# Patient Record
Sex: FEMALE | Race: BLACK OR AFRICAN AMERICAN | NOT HISPANIC OR LATINO | Employment: FULL TIME
[De-identification: names, ages, dates, MRNs, and addresses within clinical notes are randomized per-mention and may not be internally consistent; named-entity substitution may affect disease eponyms.]

---

## 2017-09-10 ENCOUNTER — HEALTH MAINTENANCE LETTER (OUTPATIENT)
Age: 42
End: 2017-09-10

## 2018-09-17 ENCOUNTER — HEALTH MAINTENANCE LETTER (OUTPATIENT)
Age: 43
End: 2018-09-17

## 2019-07-30 ENCOUNTER — APPOINTMENT (OUTPATIENT)
Dept: GENERAL RADIOLOGY | Facility: CLINIC | Age: 44
End: 2019-07-30
Attending: EMERGENCY MEDICINE
Payer: MEDICAID

## 2019-07-30 ENCOUNTER — APPOINTMENT (OUTPATIENT)
Dept: CARDIOLOGY | Facility: CLINIC | Age: 44
End: 2019-07-30
Attending: INTERNAL MEDICINE
Payer: MEDICAID

## 2019-07-30 ENCOUNTER — HOSPITAL ENCOUNTER (OUTPATIENT)
Facility: CLINIC | Age: 44
Setting detail: OBSERVATION
Discharge: HOME | End: 2019-07-31
Attending: EMERGENCY MEDICINE | Admitting: HOSPITALIST
Payer: MEDICAID

## 2019-07-30 DIAGNOSIS — D50.9 MICROCYTIC ANEMIA: ICD-10-CM

## 2019-07-30 DIAGNOSIS — R07.9 ACUTE CHEST PAIN: ICD-10-CM

## 2019-07-30 LAB
ANION GAP SERPL CALCULATED.3IONS-SCNC: 5 MMOL/L (ref 3–14)
B-HCG FREE SERPL-ACNC: <5 IU/L
BASOPHILS # BLD AUTO: 0 10E9/L (ref 0–0.2)
BASOPHILS NFR BLD AUTO: 0.5 %
BUN SERPL-MCNC: 11 MG/DL (ref 7–30)
CALCIUM SERPL-MCNC: 9.3 MG/DL (ref 8.5–10.1)
CHLORIDE SERPL-SCNC: 109 MMOL/L (ref 94–109)
CO2 SERPL-SCNC: 24 MMOL/L (ref 20–32)
CREAT SERPL-MCNC: 0.7 MG/DL (ref 0.52–1.04)
DIFFERENTIAL METHOD BLD: ABNORMAL
EOSINOPHIL # BLD AUTO: 0 10E9/L (ref 0–0.7)
EOSINOPHIL NFR BLD AUTO: 0.5 %
ERYTHROCYTE [DISTWIDTH] IN BLOOD BY AUTOMATED COUNT: 18.1 % (ref 10–15)
GFR SERPL CREATININE-BSD FRML MDRD: >90 ML/MIN/{1.73_M2}
GLUCOSE SERPL-MCNC: 86 MG/DL (ref 70–99)
HCT VFR BLD AUTO: 29.6 % (ref 35–47)
HGB BLD-MCNC: 8.4 G/DL (ref 11.7–15.7)
IMM GRANULOCYTES # BLD: 0 10E9/L (ref 0–0.4)
IMM GRANULOCYTES NFR BLD: 0.2 %
INTERPRETATION ECG - MUSE: NORMAL
IRON SATN MFR SERPL: 3 % (ref 15–46)
IRON SERPL-MCNC: 16 UG/DL (ref 35–180)
LYMPHOCYTES # BLD AUTO: 1.7 10E9/L (ref 0.8–5.3)
LYMPHOCYTES NFR BLD AUTO: 40.1 %
MCH RBC QN AUTO: 19.9 PG (ref 26.5–33)
MCHC RBC AUTO-ENTMCNC: 28.4 G/DL (ref 31.5–36.5)
MCV RBC AUTO: 70 FL (ref 78–100)
MONOCYTES # BLD AUTO: 0.4 10E9/L (ref 0–1.3)
MONOCYTES NFR BLD AUTO: 9 %
NEUTROPHILS # BLD AUTO: 2 10E9/L (ref 1.6–8.3)
NEUTROPHILS NFR BLD AUTO: 49.7 %
NRBC # BLD AUTO: 0 10*3/UL
NRBC BLD AUTO-RTO: 0 /100
PLATELET # BLD AUTO: 352 10E9/L (ref 150–450)
POTASSIUM SERPL-SCNC: 3.9 MMOL/L (ref 3.4–5.3)
RBC # BLD AUTO: 4.22 10E12/L (ref 3.8–5.2)
SODIUM SERPL-SCNC: 139 MMOL/L (ref 133–144)
TIBC SERPL-MCNC: 537 UG/DL (ref 240–430)
TROPONIN I SERPL-MCNC: <0.015 UG/L (ref 0–0.04)
WBC # BLD AUTO: 4.1 10E9/L (ref 4–11)

## 2019-07-30 PROCEDURE — 71046 X-RAY EXAM CHEST 2 VIEWS: CPT

## 2019-07-30 PROCEDURE — 83540 ASSAY OF IRON: CPT | Performed by: EMERGENCY MEDICINE

## 2019-07-30 PROCEDURE — 36415 COLL VENOUS BLD VENIPUNCTURE: CPT | Performed by: PHYSICIAN ASSISTANT

## 2019-07-30 PROCEDURE — 25000132 ZZH RX MED GY IP 250 OP 250 PS 637: Performed by: EMERGENCY MEDICINE

## 2019-07-30 PROCEDURE — 84702 CHORIONIC GONADOTROPIN TEST: CPT

## 2019-07-30 PROCEDURE — 40000264 ECHOCARDIOGRAM LIMITED

## 2019-07-30 PROCEDURE — 93321 DOPPLER ECHO F-UP/LMTD STD: CPT | Mod: 26 | Performed by: INTERNAL MEDICINE

## 2019-07-30 PROCEDURE — 93005 ELECTROCARDIOGRAM TRACING: CPT

## 2019-07-30 PROCEDURE — 93325 DOPPLER ECHO COLOR FLOW MAPG: CPT | Mod: 26 | Performed by: INTERNAL MEDICINE

## 2019-07-30 PROCEDURE — 83550 IRON BINDING TEST: CPT | Performed by: EMERGENCY MEDICINE

## 2019-07-30 PROCEDURE — 25500064 ZZH RX 255 OP 636: Performed by: EMERGENCY MEDICINE

## 2019-07-30 PROCEDURE — 99285 EMERGENCY DEPT VISIT HI MDM: CPT | Mod: 25

## 2019-07-30 PROCEDURE — 80048 BASIC METABOLIC PNL TOTAL CA: CPT | Performed by: EMERGENCY MEDICINE

## 2019-07-30 PROCEDURE — 96374 THER/PROPH/DIAG INJ IV PUSH: CPT

## 2019-07-30 PROCEDURE — 84484 ASSAY OF TROPONIN QUANT: CPT | Performed by: EMERGENCY MEDICINE

## 2019-07-30 PROCEDURE — 85025 COMPLETE CBC W/AUTO DIFF WBC: CPT | Performed by: EMERGENCY MEDICINE

## 2019-07-30 PROCEDURE — 93005 ELECTROCARDIOGRAM TRACING: CPT | Mod: 76

## 2019-07-30 PROCEDURE — 93308 TTE F-UP OR LMTD: CPT | Mod: 26 | Performed by: INTERNAL MEDICINE

## 2019-07-30 PROCEDURE — 84484 ASSAY OF TROPONIN QUANT: CPT | Performed by: PHYSICIAN ASSISTANT

## 2019-07-30 PROCEDURE — G0378 HOSPITAL OBSERVATION PER HR: HCPCS

## 2019-07-30 PROCEDURE — 99220 ZZC INITIAL OBSERVATION CARE,LEVL III: CPT | Performed by: PHYSICIAN ASSISTANT

## 2019-07-30 RX ORDER — NALOXONE HYDROCHLORIDE 0.4 MG/ML
.1-.4 INJECTION, SOLUTION INTRAMUSCULAR; INTRAVENOUS; SUBCUTANEOUS
Status: DISCONTINUED | OUTPATIENT
Start: 2019-07-30 | End: 2019-07-31 | Stop reason: HOSPADM

## 2019-07-30 RX ORDER — ASPIRIN 81 MG/1
162 TABLET, CHEWABLE ORAL ONCE
Status: DISCONTINUED | OUTPATIENT
Start: 2019-07-30 | End: 2019-07-30

## 2019-07-30 RX ORDER — ALUMINA, MAGNESIA, AND SIMETHICONE 2400; 2400; 240 MG/30ML; MG/30ML; MG/30ML
30 SUSPENSION ORAL EVERY 4 HOURS PRN
Status: DISCONTINUED | OUTPATIENT
Start: 2019-07-30 | End: 2019-07-31 | Stop reason: HOSPADM

## 2019-07-30 RX ORDER — LIDOCAINE 40 MG/G
CREAM TOPICAL
Status: DISCONTINUED | OUTPATIENT
Start: 2019-07-30 | End: 2019-07-31 | Stop reason: HOSPADM

## 2019-07-30 RX ORDER — ACETAMINOPHEN 325 MG/1
650 TABLET ORAL EVERY 4 HOURS PRN
Status: DISCONTINUED | OUTPATIENT
Start: 2019-07-30 | End: 2019-07-31 | Stop reason: HOSPADM

## 2019-07-30 RX ORDER — ACETAMINOPHEN 650 MG/1
650 SUPPOSITORY RECTAL EVERY 4 HOURS PRN
Status: DISCONTINUED | OUTPATIENT
Start: 2019-07-30 | End: 2019-07-31 | Stop reason: HOSPADM

## 2019-07-30 RX ORDER — NITROGLYCERIN 0.4 MG/1
0.4 TABLET SUBLINGUAL ONCE
Status: COMPLETED | OUTPATIENT
Start: 2019-07-30 | End: 2019-07-30

## 2019-07-30 RX ORDER — ASPIRIN 81 MG/1
324 TABLET, CHEWABLE ORAL ONCE
Status: COMPLETED | OUTPATIENT
Start: 2019-07-30 | End: 2019-07-30

## 2019-07-30 RX ORDER — ASPIRIN 81 MG/1
81 TABLET ORAL DAILY
Status: DISCONTINUED | OUTPATIENT
Start: 2019-07-31 | End: 2019-07-31 | Stop reason: HOSPADM

## 2019-07-30 RX ORDER — NITROGLYCERIN 0.4 MG/1
0.4 TABLET SUBLINGUAL EVERY 5 MIN PRN
Status: DISCONTINUED | OUTPATIENT
Start: 2019-07-30 | End: 2019-07-31 | Stop reason: HOSPADM

## 2019-07-30 RX ADMIN — HUMAN ALBUMIN MICROSPHERES AND PERFLUTREN 2 ML: 10; .22 INJECTION, SOLUTION INTRAVENOUS at 16:25

## 2019-07-30 RX ADMIN — ASPIRIN 81 MG 324 MG: 81 TABLET ORAL at 15:29

## 2019-07-30 RX ADMIN — NITROGLYCERIN 0.4 MG: 0.4 TABLET SUBLINGUAL at 12:05

## 2019-07-30 RX ADMIN — NITROGLYCERIN 0.4 MG: 0.4 TABLET SUBLINGUAL at 11:48

## 2019-07-30 ASSESSMENT — ENCOUNTER SYMPTOMS
COUGH: 0
SHORTNESS OF BREATH: 0
CHEST TIGHTNESS: 1

## 2019-07-30 ASSESSMENT — MIFFLIN-ST. JEOR: SCORE: 1727.5

## 2019-07-30 NOTE — PLAN OF CARE
"PRIMARY DIAGNOSIS: CHEST PAIN  OUTPATIENT/OBSERVATION GOALS TO BE MET BEFORE DISCHARGE:  1. Ruled out acute coronary syndrome (negative or stable Troponin): Yes  2. Pain Status: 2/10 mid-left chest  3. Appropriate provocative testing performed: echo complete, see results plan for stress test watson.  - Stress Test Procedure: stress echo tomorrow  - Interpretation of cardiac rhythm per telemetry tech: SR, HR 75    4. Cleared by Consultants (if applicable):NA  5. Return to near baseline physical activity: YES  Discharge Planner Nurse   Safe discharge environment identified: Yes  Barriers to discharge: stress test results         Entered by: April Schreiber 07/30/2019 6:59 PM   /69 (BP Location: Right arm)   Pulse 72   Temp 98.7  F (37.1  C) (Oral)   Resp 16   Ht 1.727 m (5' 8\")   Wt 101.4 kg (223 lb 8.7 oz)   SpO2 100%   BMI 33.99 kg/m    Vitals stable. Pt alert and oriented x4. Independently moving. Denies SOB/lightheadedness/dizziness. Pt continues to report mid-left sided chest pain 2/10, declines need for pain intervention. Trop neg x2. Echo stress test scheduled for tomorrow. Will continue to monitor and provide supportive cares.   Please review provider order for any additional goals.   Nurse to notify provider when observation goals have been met and patient is ready for discharge.    "

## 2019-07-30 NOTE — ED TRIAGE NOTES
5 days intermittent left sided chest pain. Pain is piercing type pain 2-3 times/day. Mild SOB, ABC's intact. A/ox 3.

## 2019-07-30 NOTE — ED NOTES
.  [facility]  ED Nurse Handoff Report    Toyin Andrew is a 43 year old female   ED Chief complaint: Chest Pain  . ED Diagnosis:   Final diagnoses:   Acute chest pain   Microcytic anemia     Allergies: No Known Allergies    Code Status: Full Code  Activity level - Baseline/Home:  Independent. Activity Level - Current:   Independent. Lift room needed: No. Bariatric: No   Needed: No   Isolation: No. Infection: Not Applicable.     Vital Signs:   Vitals:    07/30/19 1445 07/30/19 1500 07/30/19 1515 07/30/19 1530   BP: 110/71 104/68 109/69 110/86   Pulse: 67 63 63 79   Resp: 19 21 20 16   Temp:       TempSrc:       SpO2: 100% 99% 100% 100%   Weight:           Cardiac Rhythm:  ,   Cardiac  Cardiac Rhythm: Normal sinus rhythm  Pain level: 0-10 Pain Scale: 0  Patient confused: No. Patient Falls Risk: No.   Elimination Status: Has voided   Patient Report - Initial Complaint: 43 year old female who presents with chest pain. The patient states that she has been having intermittent, left sided chest pain for the last 5 days. She reports that the pain is piercing for approximately 2 minutes then is a duller, chest pressure and tightness for about an hour following the intense pain. She states these episodes occur 2-3 times day. She states that these occur when she's at rest as well as during activity. She states that exertion does not change her pain. She reports that is is not very active and has been getting very tired from activity. Positionally, the patient states sitting up relieves some of her pain. She rates her current pain at a 4.5/10. She denies any cough, shortness of breath, leg swelling or any other symptoms.   Focused Assessment:   Cardiac - Cardiac WDL: -WDL except; chest pain   Review of Systems (Cardiac) - Cardiovascular Symptoms/Conditions: chest pain (left upper chest discomfort, reports as stabbing and radiastes thorugh to left upper back)  Chest Pain Assessment - Precipitating Factors:  nothing  Chest Pain Reproducible?: No   Cardiac Monitoring - EKG Monitoring: Yes  Cardiac Regularity: Regular  Cardiac Rhythm: NSR  WB      12:22 Respiratory Respiratory - Respiratory WDL: WDL         Tests Performed: EKG, CXR, labs. Abnormal Results: .  Labs Ordered and Resulted from Time of ED Arrival Up to the Time of Departure from the ED   CBC WITH PLATELETS DIFFERENTIAL - Abnormal; Notable for the following components:       Result Value    Hemoglobin 8.4 (*)     Hematocrit 29.6 (*)     MCV 70 (*)     MCH 19.9 (*)     MCHC 28.4 (*)     RDW 18.1 (*)     All other components within normal limits   BASIC METABOLIC PANEL   TROPONIN I   TROPONIN I   ISTAT HCG QUANTITATIVE PREGNANCY NURSING POCT   ISTAT HCG QUANTITATIVE PREGNANCY POCT     .  XR Chest 2 Views   Final Result   IMPRESSION: Negative chest. Lungs clear.      NURA EVANS MD        .   Treatments provided: see ED meds below  Family Comments: NA  OBS brochure/video discussed/provided to patient:  yes  ED Medications:   Medications   nitroGLYcerin (NITROSTAT) sublingual tablet 0.4 mg (0.4 mg Sublingual Given 7/30/19 1148)   nitroGLYcerin (NITROSTAT) sublingual tablet 0.4 mg (0.4 mg Sublingual Given 7/30/19 1205)   aspirin (ASA) chewable tablet 324 mg (324 mg Oral Given 7/30/19 1529)     Drips infusing:  No  For the majority of the shift, the patient's behavior Green. Interventions performed were NA.     Severe Sepsis OR Septic Shock Diagnosis Present: No      ED Nurse Name/Phone Number: IVAN Miles  3:41 PM  RECEIVING UNIT ED HANDOFF REVIEW    Above ED Nurse Handoff Report was reviewed:YES  Reviewed by: April Schreiber on July 30, 2019 at 4:44 PM

## 2019-07-30 NOTE — PROGRESS NOTES
ROOM # 205    Living Situation (if not independent, order SW consult): zina with family  Facility name: NA  : husband-Jean-claude    Activity level at baseline: zina  Activity level on admit: zina      Patient registered to observation; given Patient Bill of Rights; given the opportunity to ask questions about observation status and their plan of care.  Patient has been oriented to the observation room, bathroom and call light is in place.    Discussed discharge goals and expectations with patient/family.

## 2019-07-30 NOTE — ED PROVIDER NOTES
History     Chief Complaint:  Chest pain     The history is provided by the patient.      Toyin Andrew is a 43 year old female who presents with chest pain. The patient states that she has been having intermittent, left sided chest pain for the last 5 days. She reports that the pain is piercing for approximately 2 minutes then is a duller, chest pressure and tightness for about an hour following the intense pain. She states these episodes occur 2-3 times day. She states that these occur when she's at rest as well as during activity. She states that exertion does not change her pain. She reports that is not very active and has been getting very tired from activity. Positionally, the patient states sitting up relieves some of her pain. She rates her current pain at a 4.5/10. She denies any cough, shortness of breath, leg swelling or any other symptoms.     Cardiac/PE/DVT Risk Factors:  History of hypertension - no  History of hyperlipidemia - no  History of diabetes - no  History of smoking - no  Personal history of PE/DVT - no  Family history of PE/DVT - no  Family history of heart complications - no  Recent travel - no  Recent surgery - no  Other immobilizations - no  Cancer - no  Birth control- no    Allergies:  No Known Allergies     Medications:    The patient is not currently taking any prescribed medications.     Past Medical History:    Anemia  HSIL  Uterine fibroid     Past Surgical History:    Left breast biopsy   C section   Uterine fibroids surgery   Right foot lump removal     Family History:    Diabetes- father, mother   Hypertension- father    Social History:  The patient was accompanied to the ED by .  Smoking Status: Never Smoker  Smokeless Tobacco: Never Used  Alcohol Use: Positive  Marital Status:       Review of Systems   Respiratory: Positive for chest tightness. Negative for cough and shortness of breath.    Cardiovascular: Positive for chest pain. Negative for leg swelling.   All  other systems reviewed and are negative.      Physical Exam     Patient Vitals for the past 24 hrs:   BP Temp Temp src Pulse Heart Rate Resp SpO2 Weight   07/30/19 1530 110/86 -- -- 79 87 16 100 % --   07/30/19 1515 109/69 -- -- 63 64 20 100 % --   07/30/19 1500 104/68 -- -- 63 62 21 99 % --   07/30/19 1445 110/71 -- -- 67 63 19 100 % --   07/30/19 1430 108/64 -- -- 73 65 21 99 % --   07/30/19 1415 101/69 -- -- 71 72 16 100 % --   07/30/19 1400 101/56 -- -- 60 60 18 99 % --   07/30/19 1345 100/59 -- -- 59 59 18 99 % --   07/30/19 1330 100/63 -- -- 59 60 17 99 % --   07/30/19 1315 111/69 -- -- 58 60 18 100 % --   07/30/19 1300 104/65 -- -- 58 60 18 100 % --   07/30/19 1245 102/62 -- -- 59 58 16 100 % --   07/30/19 1215 98/60 -- -- 71 71 18 94 % --   07/30/19 1200 -- -- -- -- -- -- 96 % --   07/30/19 1142 -- -- -- -- -- -- 98 % --   07/30/19 1130 127/78 97.8  F (36.6  C) Oral -- 80 16 -- 95.3 kg (210 lb)       Physical Exam    General:   Well-nourished   Speaking in full sentences  Eyes:   Conjunctiva without injection or scleral icterus  ENT:   Moist mucous membranes   Nares patent   Pinnae normal  Neck:   Full ROM   No stiffness appreciated  Resp:   Lungs CTAB   No crackles, wheezing or audible rubs   Good air movement  CV:    Normal rate, regular rhythm   S1 and S2 present   No murmur, gallop or rub  GI:   BS present   Abdomen soft without distention   Non-tender to light and deep palpation   No guarding or rebound tenderness  Skin:   Warm, dry, well perfused   No rashes or open wounds on exposed skin  MSK:   Moves all extremities   No focal deformities or swelling   Tenderness to palpation over left chest wall, though this does not entirely reproduce pain  Neuro:   Alert   Answers questions appropriately   Moves all extremities equally   Gait stable  Psych:   Normal affect, normal mood    PERC Rule for risk stratifying PE to low risk (calculator)  Background  Risk stratifies patients to low risk of PE if all 8  criteria are present including age <50, heart rate <100, O2 Sat >94%, no unilateral leg edema, no hemoptysis, no recent surgery or trauma, no prior VTE, and no hormone use.  Data  43 year old  has Uterine leiomyoma; CARDIOVASCULAR SCREENING; LDL GOAL LESS THAN 160; High-risk pregnancy; Blood type A+; and Fibroid, uterine on their problem list.   has a past surgical history that includes surgical history of -  (); surgical history of - ; biopsy of breast, incisional (); and  section (2013).     Criteria   Of  8 possible items (all criteria must be present):  Age <50 years  Heart rate <100 bpm  Oxygen Saturation >94%  No unilateral leg swelling  No hemoptysis  No surgery or trauma within 4 weeks  No prior DVT or PE  No hormone use (oral, transdermal and intravaginal estrogens)  Interpretation  All eight criteria are met AND low clinical PE suspicion: No further evaluation for PE required    Emergency Department Course     ECG:  ECG taken at 1137, ECG read at 1137  Normal sinus rhythm  Moderate voltage criteria for LVH, may be normal variant  boderline ECG  Rate 66 bpm. RI interval 146 ms. QRS duration 84 ms. QT/QTc 416/436 ms. P-R-T axes 62 -12 -8.     ECG:  ECG taken at 1213, ECG read at 1213  Normal sinus rhythm  Moderate voltage criteria for LVH, may be normal variant  Nonspecific T wave abnormality    abnormal ECG  Rate 74 bpm. RI interval 150 ms. QRS duration 80 ms. QT/QTc 404/448 ms. P-R-T axes 46 -13 -12.    ECG:  ECG taken at 1423, ECG read at 1423  Normal sinus rhythm with sinus arrhythmia   Moderate voltage criteria for LVH, may be normal variant  borderline ECG  Rate 66 bpm. RI interval 154 ms. QRS duration 82 ms. QT/QTc 428/448 ms. P-R-T axes 48 -11 -5. No significant change when compared to EKG dated 19.      Imaging:  Radiology findings were communicated with the patient who voiced understanding of the findings.    XR chest 2 views:  Negative chest. Lungs clear.  Reading per  radiology    Laboratory:  Laboratory findings were communicated with the patient who voiced understanding of the findings.    Troponin 1435: <0.015  Troponin 1201: <0.015    CBC: WBC 4.1, HGB 8.4(L),   BMP: WNL (Creatinine 0.70)    ISTAT HCG: negative     Interventions:  1148 nitrostat 0.4 mg sublingual   1205 nitrostat 0.4 mg sublingual  1529 aspirin 324 mg oral      Emergency Department Course:     Nursing notes and vitals reviewed.    1145 I performed an exam of the patient as documented above.     1201 IV was inserted and blood was drawn for laboratory testing, results above.    1221 The patient was sent for a chest XR while in the emergency department, results above.     1230 I returned to check on patient.  The patient reports no chest pain.     1414 I returned to check on patient.  The patient reports no chest pain.     1435 IV was inserted and blood was drawn for laboratory testing, results above.    1522 I spoke with Dr. Mack of Cardiology regarding patient's presentation, findings, and plan of care.     1541  I spoke with Rosie Duque PA-C of the Hospitalist service from [facility] regarding patient's presentation, findings, and plan of care.    1550 I personally reviewed the laboratory and imaging results with the patient and answered all related questions prior to admit.       Impression & Plan      Medical Decision Making:  Toyin Andrew is a 43-year-old female presenting to the emergency department accompanied by her  for evaluation of chest pain.  VS on presentation unremarkable.  By history, patient reports intermittent episodes of left-sided chest pressure without radiation.  Ddx is broad, including though not limited to, ACS, coronary artery dissection, vasospasm PE, dissection, pneumothorax, pneumonia, atypical reflux, muscular skeletal pain, among others.  EKG obtained at the time of arrival demonstrates sinus rhythm with 0.5 mm SIM in lead I and aVL, 0.5 mm STD in lead  III, and TWI in lead III and aVF.  Pt is not quite meeting criteria for cath lab activation, and no prior EKGs are available for comparison.  Pt was provided ASA and 2 SL nitroglycerin tabs, with resolution of her pain.  Two successive EKGs are without evolving changes.  Troponin negative.  Case and EKG reviewed with Dr. Mack of cardiology who at this time suggested transthoracic echocardiogram, as well as observation for provacative study tomorrow morning.  CXR negative for pneumothorax or pneumonia.  History and exam not consistent with aortic dissection.  PE unlikely as patient is low risk by Wells criteria and is PERC negative.  Pt updated regarding EKG findings and recommended plan of care and is agreeable.  Questions answered prior to admission.    Diagnosis:    ICD-10-CM    1. Acute chest pain R07.9 Basic metabolic panel     Troponin I     Troponin I   2. Microcytic anemia D50.9      Disposition:   The patient is admitted into the care of Rosie Duque, accepting for Dr. Rebolledo.    Scribe Disclosure:  Eugenia KELLEY, am serving as a scribe at 11:37 AM on 7/30/2019 to document services personally performed by Gabriel Flores MD based on my observations and the provider's statements to me.  [facility] EMERGENCY DEPARTMENT       Gabriel Flores MD  07/30/19 9087

## 2019-07-30 NOTE — PHARMACY-ADMISSION MEDICATION HISTORY
Admission medication history interview status for this patient is complete. See [facility] admission navigator for allergy information, prior to admission medications and immunization status.     Medication history interview source(s):Patient  Medication history resources (including written lists, pill bottles, clinic record):None    Changes made to PTA medication list:  Added: none  Deleted: triamcinolone ointment  Changed: none.     Actions taken by pharmacist (provider contacted, etc):None     Additional medication history information: Patient does not take any prescription or OTC medications.    Medication reconciliation/reorder completed by provider prior to medication history? No    Prior to Admission medications    Not on File

## 2019-07-30 NOTE — H&P
History and Physical     Toyin Andrew MRN# [id number]   YOB: 1975 Age: 43 year old      Date of Admission:  7/30/2019    Primary care provider: No Ref-Primary, Physician          Assessment and Plan:   Toyin Andrew is a 43 year old female with a PMH significant for iron deficiency anemia who presents with atypical chest discomfort.     Patient was discussed with Dr. Flores, who was provider in ED. Chart review of ED work up was reviewed as well as chart review of Care Everywhere, previous visits and admissions.     #Atypical nonexertional left-sided chest discomfort  Patient presents with nonexertional left-sided stabbing/pressure sometimes associated with shortness of breath but no diaphoresis, lightheadedness or nausea.  There is radiation to her back but no radiation to her arm or jaw.  She cannot reproduce the symptoms nor can she make them worse with movement.  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  She is obese but has no history of diabetes, hypertension, hyperlipidemia or family history of heart disease.  EKG is nonischemic but there is slight ST elevation in aVL with depression in V3 and cardiology was called from the ED requesting an echocardiogram and stress test.  -Monitor on telemetry  -Trend troponins  -Order lipid panel and A1c  -We will order echocardiogram and stress echocardiogram as recommended by cardiology  -Continue baby aspirin    #Microcytic anemia  Patient has no complaints of shortness of breath, lightheadedness, dizziness or increased fatigue.  Her hemoglobin is 8.4.  We did not discuss if she is still having heavy menses or takes iron supplements.  Her chart review states history of iron deficiency anemia.  -Add on iron studies  -Likely follow-up in primary care      Social: No concerns  Code: Discussed with patient and they have chosen full code  VTE prophylaxis: Ambulation  Disposition: Observation                    Chief Complaint:   Chest discomfort          History of Present Illness:   Toyin Andrew is a 43 year old female who presents with atypical nonexertional chest discomfort.  She has noticed 3 weeks of intermittent left-sided stabbing chest pressure that lasts for seconds with radiation to her back but no radiation down her arm or into her jaw associated with some shortness of breath but no nausea, lightheadedness or diaphoresis.  The pain comes on sporadically and is not related to exertion.  She has never had similar pains before and denies any recent injury but does work as a CNA.  When she received aspirin and nitro in the ED she states that this delayed the symptoms from returning.  She has not had any recent long car travel, air travel or surgeries.  She has not been ill recently and denies infections.  She does not take any medications regularly, smokes cigarettes or drink alcohol.  She denies all risk factors for heart disease.               Past Medical History:     Past Medical History:   Diagnosis Date     Anemia     currently taking iron     History of colposcopy with cervical biopsy 11    VONDA I     HSIL (high grade squamous intraepithelial lesion) on Pap smear 11     Pap smear of cervix with ASCUS, cannot exclude HGSIL 13     Uterine fibroid                Past Surgical History:     Past Surgical History:   Procedure Laterality Date     BIOPSY OF BREAST, INCISIONAL      left breast biopsy - negative      SECTION  2013    Procedure:  SECTION;  primary  section ;  Surgeon: Linda Borja MD;  Location: [facility] L+D     SURGICAL HISTORY OF -       uterine fibroids     SURGICAL HISTORY OF -       right foot lump removal               Social History:     Social History     Socioeconomic History     Marital status:      Spouse name: Not on file     Number of children: Not on file     Years of education: Not on file     Highest education level: Not on file   Occupational History     Not on file    Social Needs     Financial resource strain: Not on file     Food insecurity:     Worry: Not on file     Inability: Not on file     Transportation needs:     Medical: Not on file     Non-medical: Not on file   Tobacco Use     Smoking status: Never Smoker     Smokeless tobacco: Never Used   Substance and Sexual Activity     Alcohol use: No     Comment: Not during pregnancy.     Drug use: No     Sexual activity: Yes     Partners: Male   Lifestyle     Physical activity:     Days per week: Not on file     Minutes per session: Not on file     Stress: Not on file   Relationships     Social connections:     Talks on phone: Not on file     Gets together: Not on file     Attends Voodoo service: Not on file     Active member of club or organization: Not on file     Attends meetings of clubs or organizations: Not on file     Relationship status: Not on file     Intimate partner violence:     Fear of current or ex partner: Not on file     Emotionally abused: Not on file     Physically abused: Not on file     Forced sexual activity: Not on file   Other Topics Concern     Parent/sibling w/ CABG, MI or angioplasty before 65F 55M? Not Asked   Social History Narrative     Not on file               Family History:     Family History   Problem Relation Age of Onset     Diabetes Father      Hypertension Father      Diabetes Mother               Allergies:    No Known Allergies            Medications:     Prior to Admission medications    Medication Sig Last Dose Taking? Auth Provider   triamcinolone (KENALOG) 0.1 % ointment Apply sparingly to affected area three times daily for 14 days.   Fawad Morris MD              Review of Systems:   A Comprehensive greater than 10 system review of systems was carried out.  Pertinent positives and negatives are noted above.  Otherwise negative for contributory information.            Physical Exam:   Blood pressure 120/75, pulse 72, temperature 97.8  F (36.6  C), temperature source Oral,  "resp. rate 19, height 1.727 m (5' 8\"), weight 101.4 kg (223 lb 8.7 oz), SpO2 100 %.  Exam:  GENERAL:  Comfortable.  PSYCH: pleasant, oriented, No acute distress.  HEENT:  PERRLA. Normal conjunctiva, normal hearing, nasal mucosa and Oropharynx are normal.  NECK:  Supple, no neck vein distention, adenopathy or bruits, normal thyroid.  HEART:  Normal S1, S2 with no murmur, no pericardial rub, gallops or S3 or S4.  LUNGS:  Clear to auscultation, normal Respiratory effort. No wheezing, rales or ronchi.  ABDOMEN:  Soft, no hepatosplenomegaly, normal bowel sounds. Non-tender, non distended.   EXTREMITIES:  No pedal edema, +2 pulses bilateral and equal.  SKIN:  Dry to touch, No rash, wound or ulcerations.  NEUROLOGIC:  CN 2-12 intact, BL 5/5 symmetric upper and lower extremity strength, sensation is intact with no focal deficits.               Data:     Recent Labs   Lab 07/30/19  1201   WBC 4.1   HGB 8.4*   HCT 29.6*   MCV 70*        Recent Labs   Lab 07/30/19  1201      POTASSIUM 3.9   CHLORIDE 109   CO2 24   ANIONGAP 5   GLC 86   BUN 11   CR 0.70   GFRESTIMATED >90   GFRESTBLACK >90   GEORGETTE 9.3     No results for input(s): DD in the last 168 hours.  Recent Labs   Lab 07/30/19  1435 07/30/19  1201   TROPI <0.015 <0.015         Recent Results (from the past 24 hour(s))   XR Chest 2 Views    Narrative    CHEST TWO VIEWS  7/30/2019 12:24 PM     HISTORY:  Chest pain.    COMPARISON: None.      Impression    IMPRESSION: Negative chest. Lungs clear.    MD Rosie CHEN PA-C         "

## 2019-07-31 ENCOUNTER — APPOINTMENT (OUTPATIENT)
Dept: CARDIOLOGY | Facility: CLINIC | Age: 44
End: 2019-07-31
Attending: PHYSICIAN ASSISTANT
Payer: MEDICAID

## 2019-07-31 VITALS
HEIGHT: 68 IN | SYSTOLIC BLOOD PRESSURE: 120 MMHG | BODY MASS INDEX: 33.88 KG/M2 | TEMPERATURE: 98.1 F | WEIGHT: 223.55 LBS | RESPIRATION RATE: 16 BRPM | OXYGEN SATURATION: 98 % | DIASTOLIC BLOOD PRESSURE: 65 MMHG | HEART RATE: 68 BPM

## 2019-07-31 LAB
CHOLEST SERPL-MCNC: 185 MG/DL
HBA1C MFR BLD: 5.1 % (ref 0–5.6)
HDLC SERPL-MCNC: 47 MG/DL
INTERPRETATION ECG - MUSE: NORMAL
LDLC SERPL CALC-MCNC: 116 MG/DL
NONHDLC SERPL-MCNC: 130 MG/DL
TRIGL SERPL-MCNC: 71 MG/DL

## 2019-07-31 PROCEDURE — 93018 CV STRESS TEST I&R ONLY: CPT | Performed by: INTERNAL MEDICINE

## 2019-07-31 PROCEDURE — 93325 DOPPLER ECHO COLOR FLOW MAPG: CPT | Mod: 26 | Performed by: INTERNAL MEDICINE

## 2019-07-31 PROCEDURE — G0378 HOSPITAL OBSERVATION PER HR: HCPCS

## 2019-07-31 PROCEDURE — 93350 STRESS TTE ONLY: CPT | Mod: 26 | Performed by: INTERNAL MEDICINE

## 2019-07-31 PROCEDURE — 93321 DOPPLER ECHO F-UP/LMTD STD: CPT | Mod: 26 | Performed by: INTERNAL MEDICINE

## 2019-07-31 PROCEDURE — 93016 CV STRESS TEST SUPVJ ONLY: CPT | Performed by: INTERNAL MEDICINE

## 2019-07-31 PROCEDURE — 25500064 ZZH RX 255 OP 636: Performed by: HOSPITALIST

## 2019-07-31 PROCEDURE — 36415 COLL VENOUS BLD VENIPUNCTURE: CPT | Performed by: PHYSICIAN ASSISTANT

## 2019-07-31 PROCEDURE — 99217 ZZC OBSERVATION CARE DISCHARGE: CPT | Performed by: PHYSICIAN ASSISTANT

## 2019-07-31 PROCEDURE — 80061 LIPID PANEL: CPT | Performed by: PHYSICIAN ASSISTANT

## 2019-07-31 PROCEDURE — 83036 HEMOGLOBIN GLYCOSYLATED A1C: CPT | Performed by: PHYSICIAN ASSISTANT

## 2019-07-31 PROCEDURE — 25000132 ZZH RX MED GY IP 250 OP 250 PS 637: Performed by: PHYSICIAN ASSISTANT

## 2019-07-31 PROCEDURE — 40000264 ECHO STRESS ECHOCARDIOGRAM

## 2019-07-31 RX ORDER — FERROUS GLUCONATE 324(38)MG
324 TABLET ORAL
Refills: 0 | COMMUNITY
Start: 2019-07-31

## 2019-07-31 RX ADMIN — ASPIRIN 81 MG: 81 TABLET, COATED ORAL at 08:10

## 2019-07-31 RX ADMIN — HUMAN ALBUMIN MICROSPHERES AND PERFLUTREN 3 ML: 10; .22 INJECTION, SOLUTION INTRAVENOUS at 07:44

## 2019-07-31 NOTE — PLAN OF CARE
PRIMARY DIAGNOSIS: CHEST PAIN  OUTPATIENT/OBSERVATION GOALS TO BE MET BEFORE DISCHARGE:  1. Ruled out acute coronary syndrome (negative or stable Troponin):  Yes  2. Pain Status: Pain free.  3. Appropriate provocative testing performed: No  - Stress Test Procedure: Stress test in AM   - Interpretation of cardiac rhythm per telemetry tech: SR in 70's     4. Cleared by Consultants (if applicable):N/A  5. Return to near baseline physical activity: Yes  Discharge Planner Nurse   Safe discharge environment identified: Yes  Barriers to discharge: Yes. Stress test results.        Entered by: Julieta Vivas 07/31/2019 1:40 AM     Please review provider order for any additional goals.   Nurse to notify provider when observation goals have been met and patient is ready for discharge.

## 2019-07-31 NOTE — PLAN OF CARE
Patient's After Visit Summary was reviewed with patient.  Patient verbalized understanding of After Visit Summary, recommended follow up and was given an opportunity to ask questions.   Discharge medications sent home with patient/family: yes.  Discharged by self - pt feels safe to drive home, no narcs given during hospital stay.      OBSERVATION patient END time: 8933

## 2019-07-31 NOTE — DISCHARGE SUMMARY
[facility] Outpatient / Observation Unit  Discharge Summary        Toyin Andrew MRN# [id number]   YOB: 1975 Age: 43 year old     Date of Admission: 7/30/2019  Date of Discharge: 7/31/2019  Admitting Physician: Felipe Rebolledo MD  Discharge Physician: Mary Art PA-C  Discharging Service: Hospitalist      Primary Provider: No Ref-Primary, Physician  Primary Care Physician Phone Number: None         Primary Discharge Diagnoses:    Toyin Andrew is a 44 y/o female with PMH significant for iron deficiency anemia was admitted on 7/30/2019 for concerns of acute chest pain.     #Atypical nonexertional left-sided chest discomfort  Patient presents with nonexertional left-sided stabbing/pressure sometimes associated with shortness of breath but no diaphoresis, lightheadedness or nausea.  There is radiation to her back but no radiation to her arm or jaw.  She cannot reproduce the symptoms nor can she make them worse with movement.  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  She is obese but total cholesterol showed LDL of 116 otherwise WNL, HgbA1c WNL, and blood pressure stable during stay. EKG on admission was nonischemic but there is slight ST elevation in aVL with depression in V3 and cardiology was called from the ED requesting an echocardiogram and stress test which were normal.      #Microcytic anemia  Hemoglobin of 8.4 on admission. H/o heavy menses and uterine fibroids. Iron studies consistent with iron deficiency. Patient previously on iron supplements which she has not been taking. Encouraged patient to restart iron supplements and establish care with PCP for monitoring and recheck of hemoglobin.         Secondary Discharge Diagnoses:     Past Medical History:   Diagnosis Date     Anemia     currently taking iron     History of colposcopy with cervical biopsy 8/2/11    VONDA I     HSIL (high grade squamous intraepithelial lesion) on Pap smear 6/14/11     Pap smear of cervix with ASCUS, cannot  exclude HGSIL 1/22/13     Uterine fibroid             Code Status:      Full Code        Brief Hospital Summary:        Reason for your hospital stay      You were admitted to the observation unit for chest pain. Your heart was   monitored overnight with no abnormal findings. Your cardiac enzymes were   negative for heart attack. You had an ultrasound of your heart   (echocardiogram) which showed normal pumping function and no wall motion   changes and stress echocardiogram that was negative for heart disease so   we do not believe your chest pain was related to your heart. Your chest   pain may be due to recent increased stress. We recommend you follow up   with your primary doctor if you continue to have pain. It was noted that   your hemoglobin was low at 8.4 and you have a known history anemia due to   iron deficiency from heavy menses and fibroids, it is recommended you   restart supplemental iron and follow up with primary care for monitoring   of this.             Please refer to initial admission history and physical for further details.   Briefly, Toyin Andrew was admitted on 7/30/2019 for concerns of acute chest pain. Initial work up in the ED did not reveal evidence of STEMI or findings consistent with unstable angina or acute coronary ischemia. Pt was registered to the Observation Unit for further evaluation.     Pt ruled out with serial troponins, underwent Echocardiogram which showed normal pumping function and no WMA and Stress Echo that did not show evidence of significant coronary ischemia. Labs were reviewed and significant results addressed. On the day of discharge, pt was pain free, with no complaints of pain. Medications were reviewed and adjustments made as necessary. Pt is instructed to follow up as below.           Significant Lab During Hospitalization:      Recent Labs   Lab 07/30/19  2234 07/30/19  1850 07/30/19  1435   TROPI <0.015 <0.015 <0.015              Significant Imaging During  "Hospitalization:      Results for orders placed or performed during the hospital encounter of 07/30/19   XR Chest 2 Views    Narrative    CHEST TWO VIEWS  7/30/2019 12:24 PM     HISTORY:  Chest pain.    COMPARISON: None.      Impression    IMPRESSION: Negative chest. Lungs clear.    NURA EVANS MD              Pending Results:      None        Consultations This Hospital Stay:      No consultations were requested during this admission         Discharge Instructions and Follow-Up:      Follow-up Appointments     Follow-up and recommended labs and tests       Follow up with primary care provider within 7 days for hospital follow-up   and establish care. The following labs/tests are recommended: hemoglobin.                 Discharge Disposition:      Discharged to home         Discharge Medications:        Current Discharge Medication List      START taking these medications    Details   ferrous gluconate (FERGON) 324 (38 Fe) MG tablet Take 1 tablet (324 mg) by mouth daily (with breakfast)  Refills: 0    Associated Diagnoses: Microcytic anemia                 Allergies:       No Known Allergies        Condition and Physical on Discharge:      Discharge condition: Stable   Vitals: Blood pressure 104/64, pulse 73, temperature 99.1  F (37.3  C), temperature source Oral, resp. rate 16, height 1.727 m (5' 8\"), weight 101.4 kg (223 lb 8.7 oz), SpO2 100 %.  223 lbs 8.74 oz      GENERAL:  Comfortable.  PSYCH: pleasant, oriented, No acute distress.  HEENT:  PERRLA. Normal conjunctiva, normal hearing, nasal mucosa and oropharynx are normal.  NECK:  Supple, no neck vein distention, adenopathy or bruits, normal thyroid.  HEART:  Normal S1, S2 with no murmur, no pericardial rub, gallops or S3 or S4.  LUNGS:  Clear to auscultation, normal respiratory effort. No wheezing, rales or ronchi.  ABDOMEN:  Soft, no hepatosplenomegaly, normal bowel sounds. Non-tender, non distended.   EXTREMITIES:  No pedal edema, +2 radial and " posterior tibial pulses bilateral and equal.  SKIN:  Dry to touch, No rash, wound or ulcerations.  NEUROLOGIC:  CN 2-12 intact, BL 5/5 symmetric upper and lower extremity strength, sensation is intact with no focal deficits.     Mary Art PA-C

## 2019-07-31 NOTE — PLAN OF CARE
"PRIMARY DIAGNOSIS: CHEST PAIN  OUTPATIENT/OBSERVATION GOALS TO BE MET BEFORE DISCHARGE:  1. Ruled out acute coronary syndrome (negative or stable Troponin):  Yes  2. Pain Status: Pain free.  3. Appropriate provocative testing performed: No  - Stress Test Procedure: Stress test in AM   - Interpretation of cardiac rhythm per telemetry tech: SR in 70's     4. Cleared by Consultants (if applicable):N/A  5. Return to near baseline physical activity: Yes  Discharge Planner Nurse   Safe discharge environment identified: Yes  Barriers to discharge: Yes. Stress test results.        Entered by: Julieta Vivas 07/31/2019 4:32 AM    Vital signs:  Temp: 97.2  F (36.2  C) Temp src: Oral BP: 106/60 Pulse: 73 Heart Rate: 65 Resp: 16 SpO2: 98 % O2 Device: None (Room air)   Height: 172.7 cm (5' 8\") Weight: 101.4 kg (223 lb 8.7 oz)  Pt A&Ox4. Independent in the room. LS clear. BS audible. Denies any chest pain. Trops neg x3. Echo stress test scheduled for this AM. Will continue to monitor.       Please review provider order for any additional goals.   Nurse to notify provider when observation goals have been met and patient is ready for discharge.    "

## 2019-07-31 NOTE — PLAN OF CARE
"PRIMARY DIAGNOSIS: CHEST PAIN  OUTPATIENT/OBSERVATION GOALS TO BE MET BEFORE DISCHARGE:  1. Ruled out acute coronary syndrome (negative or stable Troponin):  Yes, trops < 0.015x3  2. Pain Status: Pain free.  3. Appropriate provocative testing performed: Yes  - Stress Test Procedure: Echo  - Interpretation of cardiac rhythm per telemetry tech: SR HR 70s    4. Cleared by Consultants (if applicable):No  5. Return to near baseline physical activity: Yes  Discharge Planner Nurse   Safe discharge environment identified: Yes  Barriers to discharge: No       Entered by: Demi Watts 07/31/2019 8:17 AM     Please review provider order for any additional goals.   Nurse to notify provider when observation goals have been met and patient is ready for discharge.  /64 (BP Location: Left arm)   Pulse 73   Temp 99.1  F (37.3  C) (Oral)   Resp 16   Ht 1.727 m (5' 8\")   Wt 101.4 kg (223 lb 8.7 oz)   SpO2 100%   BMI 33.99 kg/m      Pt returned from stress echo, tele placed back on reading SR. Denies chest pain, states last chest pain was last night. Denies numbness or tingling. No concerns noted at this time. Will remain NPO until echo stress test results read.  "

## 2019-11-08 ENCOUNTER — HEALTH MAINTENANCE LETTER (OUTPATIENT)
Age: 44
End: 2019-11-08

## 2020-02-10 ENCOUNTER — NURSE TRIAGE (OUTPATIENT)
Dept: INTERNAL MEDICINE | Facility: CLINIC | Age: 45
End: 2020-02-10

## 2020-02-10 NOTE — TELEPHONE ENCOUNTER
Complains of 2 day history of left chest pain, intermittent.  Lasts for a few minutes then goes away, occurs 1-2 times a week.  Pain comes on when she is sitting and leaning to the left. Had pain once before and went to ER in July 2019.  Had stress test, all tests negative for heart problems.  Cold symptoms onset 2/7/20 including productive cough of white phlegm.  Denies SOB, wheezing, fever or radiation of pain into arms, back, neck or jaw.  No injury to chest, no area of tenderness to palpation.    No openings today, needs to re-establish care.  Moved back here from [place].  Advised UC and also advised patient to make appt to establish care.  PAMELA Ling R.N.      Additional Information    Negative: Severe difficulty breathing (e.g., struggling for each breath, speaks in single words)    Negative: Passed out (i.e., fainted, collapsed and was not responding)    Negative: Chest pain lasting longer than 5 minutes and ANY of the following:* Over 50 years old* Over 30 years old and at least one cardiac risk factor (i.e., high blood pressure, diabetes, high cholesterol, obesity, smoker or strong family history of heart disease)* Pain is crushing, pressure-like, or heavy * Took nitroglycerin and chest pain was not relieved* History of heart disease (i.e., angina, heart attack, bypass surgery, angioplasty, CHF)    Negative: Visible sweat on face or sweat dripping down face    Negative: Sounds like a life-threatening emergency to the triager    Negative: Followed an injury to chest    Negative: SEVERE chest pain    Negative: Pain also present in shoulder(s) or arm(s) or jaw    Negative: Difficulty breathing    Negative: History of prior 'blood clot' in leg or lungs (i.e., deep vein thrombosis, pulmonary embolism)    Negative: Recent illness requiring prolonged bed rest (i.e., immobilization)    Negative: Hip or leg fracture in past 2 months (e.g, or had cast on leg or ankle)    Negative: Major surgery in the past month     "Negative: Recent long-distance travel with prolonged time in car, bus, plane, or train (i.e., within past 2 weeks; 6 or more hours duration)    Negative: Heart beating irregularly or very rapidly    Negative: Chest pain lasting longer than 5 minutes    Negative: Intermittent chest pain and pain has been increasing in severity or frequency    Negative: Dizziness or lightheadedness    Negative: Coughing up blood    Negative: Patient sounds very sick or weak to the triager    Negative: Fever > 100.5 F (38.1 C)    Negative: All other patients with chest pain    Negative: Intermittent mild chest pain lasting a few seconds each time    Patient wants to be seen     Looking for advice from triage nurse    Intermittent chest pains persist > 3 days    Answer Assessment - Initial Assessment Questions  1. LOCATION: \"Where does it hurt?\"        Left chest  2. RADIATION: \"Does the pain go anywhere else?\" (e.g., into neck, jaw, arms, back)      Denies radiation of pain into arms, back, jaw, neck or R side of chest,  3. ONSET: \"When did the chest pain begin?\" (Minutes, hours or days)   Unsure, maybe in past 1-2 weeks      4. PATTERN \"Does the pain come and go, or has it been constant since it started?\"  \"Does it get worse with exertion?\"       Comes and goes, does not worsen with exertion.  5. DURATION: \"How long does it last\" (e.g., seconds, minutes, hours)      A few minutes  6. SEVERITY: \"How bad is the pain?\"  (e.g., Scale 1-10; mild, moderate, or severe)     - MILD (1-3): doesn't interfere with normal activities      - MODERATE (4-7): interferes with normal activities or awakens from sleep     - SEVERE (8-10): excruciating pain, unable to do any normal activities        3 out of 10 when present, 0 now.  7. CARDIAC RISK FACTORS: \"Do you have any history of heart problems or risk factors for heart disease?\" (e.g., prior heart attack, angina; high blood pressure, diabetes, being overweight, high cholesterol, smoking, or strong " "family history of heart disease)      none  8. PULMONARY RISK FACTORS: \"Do you have any history of lung disease?\"  (e.g., blood clots in lung, asthma, emphysema, birth control pills)      Denies history of   9. CAUSE: \"What do you think is causing the chest pain?\"      3 out of 10 then fully resolves  10. OTHER SYMPTOMS: \"Do you have any other symptoms?\" (e.g., dizziness, nausea, vomiting, sweating, fever, difficulty breathing, cough)        Denies dizziness, nausea, vomiting, sweating, fever, breathing difficulty.  Does have a cold currently with productive cough of white phlegm.  11. PREGNANCY: \"Is there any chance you are pregnant?\" \"When was your last menstrual period?\"        No. 2/3/20    Protocols used: CHEST PAIN-A-OH      "

## 2020-02-12 ENCOUNTER — OFFICE VISIT (OUTPATIENT)
Dept: INTERNAL MEDICINE | Facility: CLINIC | Age: 45
End: 2020-02-12
Payer: COMMERCIAL

## 2020-02-12 VITALS
SYSTOLIC BLOOD PRESSURE: 110 MMHG | TEMPERATURE: 98.1 F | WEIGHT: 223 LBS | HEART RATE: 73 BPM | DIASTOLIC BLOOD PRESSURE: 72 MMHG | BODY MASS INDEX: 33.91 KG/M2 | OXYGEN SATURATION: 98 % | RESPIRATION RATE: 16 BRPM

## 2020-02-12 DIAGNOSIS — M20.61 TOE DEFORMITY, ACQUIRED, RIGHT: ICD-10-CM

## 2020-02-12 DIAGNOSIS — R07.9 CHEST PAIN, UNSPECIFIED TYPE: Primary | ICD-10-CM

## 2020-02-12 DIAGNOSIS — D64.9 ANEMIA, UNSPECIFIED TYPE: ICD-10-CM

## 2020-02-12 LAB
ERYTHROCYTE [DISTWIDTH] IN BLOOD BY AUTOMATED COUNT: 19.1 % (ref 10–15)
HCT VFR BLD AUTO: 31.3 % (ref 35–47)
HGB BLD-MCNC: 9.2 G/DL (ref 11.7–15.7)
MCH RBC QN AUTO: 21.6 PG (ref 26.5–33)
MCHC RBC AUTO-ENTMCNC: 29.4 G/DL (ref 31.5–36.5)
MCV RBC AUTO: 74 FL (ref 78–100)
PLATELET # BLD AUTO: 310 10E9/L (ref 150–450)
RBC # BLD AUTO: 4.25 10E12/L (ref 3.8–5.2)
WBC # BLD AUTO: 4.4 10E9/L (ref 4–11)

## 2020-02-12 PROCEDURE — 99203 OFFICE O/P NEW LOW 30 MIN: CPT | Performed by: NURSE PRACTITIONER

## 2020-02-12 PROCEDURE — 80061 LIPID PANEL: CPT | Performed by: NURSE PRACTITIONER

## 2020-02-12 PROCEDURE — 84443 ASSAY THYROID STIM HORMONE: CPT | Performed by: NURSE PRACTITIONER

## 2020-02-12 PROCEDURE — 84460 ALANINE AMINO (ALT) (SGPT): CPT | Performed by: NURSE PRACTITIONER

## 2020-02-12 PROCEDURE — 36415 COLL VENOUS BLD VENIPUNCTURE: CPT | Performed by: NURSE PRACTITIONER

## 2020-02-12 PROCEDURE — 85027 COMPLETE CBC AUTOMATED: CPT | Performed by: NURSE PRACTITIONER

## 2020-02-12 PROCEDURE — 80048 BASIC METABOLIC PNL TOTAL CA: CPT | Performed by: NURSE PRACTITIONER

## 2020-02-12 NOTE — PROGRESS NOTES
Subjective     Toyin Andrew is a 44 year old female who presents to clinic today for the following health issues:    HPI   Chest Pain      Onset: 1 year    Description (location/character/radiation/duration): intermittentL chest wall pain, dull, last a few minutes    Intensity:  mild    Accompanying signs and symptoms:        Shortness of breath: no        Sweating: no        Nausea/vomitting: no        Palpitations: YES- occ       Other (fevers/chills/cough/heartburn/lightheadedness): no     History (similar episodes/previous evaluation): ER and cardio w/u normal    Precipitating or alleviating factors:       Worse with exertion: no Does occur when leaning to L, seated       Worse with breathing: no        Related to eating: no        Better with burping: no     Therapies tried and outcome: None        Patient Active Problem List   Diagnosis     Uterine leiomyoma     CARDIOVASCULAR SCREENING; LDL GOAL LESS THAN 160     High-risk pregnancy     Blood type A+     Fibroid, uterine     Past Surgical History:   Procedure Laterality Date     BIOPSY OF BREAST, INCISIONAL      left breast biopsy - negative      SECTION  2013    Procedure:  SECTION;  primary  section ;  Surgeon: Linda Borja MD;  Location: [facility] L[facility]     SURGICAL HISTORY OF -       uterine fibroids     SURGICAL HISTORY OF -       right foot lump removal       Social History     Tobacco Use     Smoking status: Never Smoker     Smokeless tobacco: Never Used   Substance Use Topics     Alcohol use: No     Comment: Not during pregnancy.     Family History   Problem Relation Age of Onset     Diabetes Father      Hypertension Father      Diabetes Mother          Current Outpatient Medications   Medication Sig Dispense Refill     ferrous gluconate (FERGON) 324 (38 Fe) MG tablet Take 1 tablet (324 mg) by mouth daily (with breakfast)  0     Recent Labs   Lab Test 19  0632 19  1201 02/10/14  0948 13  1528  03/27/12  1000   A1C 5.1  --   --   --   --    *  --  118  --  94   HDL 47*  --  38*  --  49*   TRIG 71  --  50  --  38   ALT  --   --   --   --  13   CR  --  0.70  --   --  0.72   GFRESTIMATED  --  >90  --   --  >90   GFRESTBLACK  --  >90  --   --  >90   POTASSIUM  --  3.9  --   --  4.4   TSH  --   --   --  1.62 1.24      BP Readings from Last 3 Encounters:   02/12/20 110/72   07/31/19 120/65   02/10/14 108/70    Wt Readings from Last 3 Encounters:   02/12/20 101.2 kg (223 lb)   07/30/19 101.4 kg (223 lb 8.7 oz)   02/10/14 102.5 kg (226 lb)                    H/o anemia, on FE supps  Heavy periods, fibroid uterus    H/o R foot surgery out of state  Persistent toe deformity and pain  Reviewed and updated as needed this visit by Provider         Review of Systems   ROS COMP: CONSTITUTIONAL: NEGATIVE for fever, chills, change in weight  ENT/MOUTH: NEGATIVE for ear, mouth and throat problems  RESP: NEGATIVE for significant cough or SOB  CV: NEGATIVE for claudication, cyanosis, diaphoresis, dyspnea on exertion, lower extremity edema and paroxysmal nocturnal dyspnea  MUSCULOSKELETAL: POSITIVE  for R toe pain  HEME/ALLERGY/IMMUNE: POSITIVE  for anemia  ROS otherwise negative      Objective    /72   Pulse 73   Temp 98.1  F (36.7  C)   Resp 16   Wt 101.2 kg (223 lb)   SpO2 98%   Breastfeeding No   BMI 33.91 kg/m      Physical Exam   GENERAL: healthy, alert and no distress  NECK: no adenopathy, no asymmetry, masses, or scars and thyroid normal to palpation  RESP: lungs clear to auscultation - no rales, rhonchi or wheezes  CV: regular rate and rhythm, normal S1 S2, no S3 or S4, no murmur, click or rub, no peripheral edema and peripheral pulses strong  CV: L chest wall, no reproducible pain on palpation  ABDOMEN: soft, nontender, no hepatosplenomegaly, no masses and bowel sounds normal  MS: R 1-2 toe post surgical straightening and deformity  PSYCH: mentation appears normal and affect flat         "    Assessment & Plan       ICD-10-CM    1. Chest pain, unspecified type R07.9 CBC with platelets     Basic metabolic panel     ALT     Lipid Profile     TSH with free T4 reflex     Zio Patch Holter Adult Pediatric Greater than 48 hrs   2. Anemia, unspecified type D64.9 Basic metabolic panel   3. Toe deformity, acquired, right M20.61 [facility]  Referral        BMI:   Estimated body mass index is 33.91 kg/m  as calculated from the following:    Height as of 7/30/19: 1.727 m (5' 8\").    Weight as of this encounter: 101.2 kg (223 lb).         F/u pending holter, labs  Suspect musculoskeletal chest wall pain  Address anemia pending labs  Podiatry f/u    Elsa Arambula NP  [facility]      "

## 2020-02-13 LAB
ALT SERPL W P-5'-P-CCNC: 23 U/L (ref 0–50)
ANION GAP SERPL CALCULATED.3IONS-SCNC: 4 MMOL/L (ref 3–14)
BUN SERPL-MCNC: 8 MG/DL (ref 7–30)
CALCIUM SERPL-MCNC: 8.9 MG/DL (ref 8.5–10.1)
CHLORIDE SERPL-SCNC: 109 MMOL/L (ref 94–109)
CHOLEST SERPL-MCNC: 198 MG/DL
CO2 SERPL-SCNC: 25 MMOL/L (ref 20–32)
CREAT SERPL-MCNC: 0.6 MG/DL (ref 0.52–1.04)
GFR SERPL CREATININE-BSD FRML MDRD: >90 ML/MIN/{1.73_M2}
GLUCOSE SERPL-MCNC: 100 MG/DL (ref 70–99)
HDLC SERPL-MCNC: 51 MG/DL
LDLC SERPL CALC-MCNC: 131 MG/DL
NONHDLC SERPL-MCNC: 147 MG/DL
POTASSIUM SERPL-SCNC: 4.2 MMOL/L (ref 3.4–5.3)
SODIUM SERPL-SCNC: 138 MMOL/L (ref 133–144)
TRIGL SERPL-MCNC: 81 MG/DL
TSH SERPL DL<=0.005 MIU/L-ACNC: 1.44 MU/L (ref 0.4–4)

## 2020-02-17 ENCOUNTER — ANCILLARY PROCEDURE (OUTPATIENT)
Dept: GENERAL RADIOLOGY | Facility: CLINIC | Age: 45
End: 2020-02-17
Attending: PODIATRIST
Payer: COMMERCIAL

## 2020-02-17 ENCOUNTER — OFFICE VISIT (OUTPATIENT)
Dept: PODIATRY | Facility: CLINIC | Age: 45
End: 2020-02-17
Payer: COMMERCIAL

## 2020-02-17 VITALS
BODY MASS INDEX: 33.8 KG/M2 | HEIGHT: 68 IN | SYSTOLIC BLOOD PRESSURE: 108 MMHG | DIASTOLIC BLOOD PRESSURE: 70 MMHG | WEIGHT: 223 LBS

## 2020-02-17 DIAGNOSIS — M79.674 TOE PAIN, RIGHT: Primary | ICD-10-CM

## 2020-02-17 DIAGNOSIS — M79.674 TOE PAIN, RIGHT: ICD-10-CM

## 2020-02-17 PROCEDURE — 99244 OFF/OP CNSLTJ NEW/EST MOD 40: CPT | Performed by: PODIATRIST

## 2020-02-17 PROCEDURE — 73630 X-RAY EXAM OF FOOT: CPT | Mod: RT

## 2020-02-17 ASSESSMENT — MIFFLIN-ST. JEOR: SCORE: 1720.02

## 2020-02-17 NOTE — PATIENT INSTRUCTIONS
Thank you for choosing [facility] Podiatry / Foot & Ankle Surgery!    DR. ANSARI'S CLINIC LOCATIONS:   MONDAY - [place] TUESDAY AM - [place]   [address] [facility]  [address]   [place] [address] [place] [address]   [phone]       THURSDAY AM - [place] THURSDAY PM - [place]   [address]   [place] [address] [place] [address]   [phone]       FRIDAY AM - [place] SET UP SURGERY: [phone] [address] APPOINTMENTS: [phone]   [place] [address] BILLING QUESTIONS: [phone] FAX NUMBER: [phone]     [facility] SCHEDULING  They should be calling you within 24 hours to schedule your scan.  If not, please call the location discussed at your appointment.    1) [facility]:       [phone]      [address] [place] [place] [address]    2) [facility]:      [phone] [address] [place] [address]    3) [facility]:       [phone]      [address] [place] [address]      FOOT & ANKLE SURGERY PLANNING CHECKLIST  If you have decided to have surgery, follow these 5 steps to get the procedure scheduled and to have the proper paperwork filled out. If you are unsure about surgery or would like to sit down and further discuss your issue and treatment options, please make an appointment.    1.  Pick the date that you would like to have surgery. Surgery is done on a Wednesday at [facility] or [facility]. Keep in mind that you will likely need at least 2 weeks off after the procedure for proper rest and healing.    2.  Call the surgery scheduling line at [phone] to get the procedure scheduled. Our  will also help you make your pre-operative physical with a primary doctor, your surgery consult appointment with me and your one week follow up after surgery for your first dressing  "change.    3.  If our surgery scheduler does not make your surgery consultation appointment with me then call to schedule that. When making the appointment, say \"I need to make a 30 minute surgical consult appointment\". It is recommended that you bring a spouse, family member or friend with you. There will be lots of information presented. It can be overwhelming and it is better to have someone there to help sort out the details.    4. Contact your employer to request time off from work if needed. If there is going to be FMLA used, please have them fax the forms to [phone] at least one week prior to surgery.    * If you have any post-operative questions regarding your procedure, call our triage team at the [facility] at [phone] (option 2 > option 3)    "

## 2020-02-17 NOTE — Clinical Note
Hailey saw Toyin recently for 2nd MPJ pain.  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  I ordered an MRI for further assessment of both issues.  I anticipate the 2nd MPJ will need surgery.Evelin

## 2020-02-17 NOTE — PROGRESS NOTES
"Foot & Ankle Surgery  2020    CC: \"swollen toe\"    I was asked to see Toyin Kin regarding the chief complaint by:  RAYMUNDO Arambula    HPI:  Pt is a 44 year old female who presents with above complaint.  Right toe issue x months.  Throbbing, tingling, shooting.  Pain 8/10 \"when press on it or put pressure and sometime when I walk\", worse with \"pressure, walking\".  Previous surgery, \"they open it, scrape it\".  The joint/toe was good for a while but it \"grow back\".  Surgery was done in [place] 2017, bunion and 2nd hammertoe with met head osteotomy, but subsequent 1st ray hardware removal.  Screw remains in metatarsal head, which is significantly degenerated.      ROS:   Pos for CC.  The patient denies current nausea, vomiting, chills, fevers, belly pain, calf pain, chest pain or SOB.  Complete remainder of ROS is otherwise neg.    VITALS:    Vitals:    20 0944   BP: 108/70   Weight: 101.2 kg (223 lb)   Height: 1.727 m (5' 8\")       PMH:    Past Medical History:   Diagnosis Date     Anemia     currently taking iron     History of colposcopy with cervical biopsy 11    VONDA I     HSIL (high grade squamous intraepithelial lesion) on Pap smear 11     Pap smear of cervix with ASCUS, cannot exclude HGSIL 13     Uterine fibroid        SXHX:    Past Surgical History:   Procedure Laterality Date     BIOPSY OF BREAST, INCISIONAL      left breast biopsy - negative      SECTION  2013    Procedure:  SECTION;  primary  section ;  Surgeon: Linda Borja MD;  Location:  L+D     SURGICAL HISTORY OF -       uterine fibroids     SURGICAL HISTORY OF -       right foot lump removal        MEDS:    Current Outpatient Medications   Medication     ferrous gluconate (FERGON) 324 (38 Fe) MG tablet     No current facility-administered medications for this visit.        ALL:   No Known Allergies    FMH:    Family History   Problem Relation Age of Onset     Diabetes Father      " Hypertension Father      Diabetes Mother        SocHx:    Social History     Socioeconomic History     Marital status:      Spouse name: Not on file     Number of children: Not on file     Years of education: Not on file     Highest education level: Not on file   Occupational History     Not on file   Social Needs     Financial resource strain: Not on file     Food insecurity:     Worry: Not on file     Inability: Not on file     Transportation needs:     Medical: Not on file     Non-medical: Not on file   Tobacco Use     Smoking status: Never Smoker     Smokeless tobacco: Never Used   Substance and Sexual Activity     Alcohol use: No     Comment: Not during pregnancy.     Drug use: No     Sexual activity: Yes     Partners: Male   Lifestyle     Physical activity:     Days per week: Not on file     Minutes per session: Not on file     Stress: Not on file   Relationships     Social connections:     Talks on phone: Not on file     Gets together: Not on file     Attends Judaism service: Not on file     Active member of club or organization: Not on file     Attends meetings of clubs or organizations: Not on file     Relationship status: Not on file     Intimate partner violence:     Fear of current or ex partner: Not on file     Emotionally abused: Not on file     Physically abused: Not on file     Forced sexual activity: Not on file   Other Topics Concern     Parent/sibling w/ CABG, MI or angioplasty before 65F 55M? Not Asked   Social History Narrative     Not on file           EXAMINATION:  Gen:   No apparent distress  Neuro:   A&Ox3, no deficits  Psych:    Answering questions appropriately for age and situation with normal affect  Head:    NCAT  Eye:    Visual scanning without deficit  Ear:    Response to auditory stimuli wnl  Lung:    Non-labored breathing on RA noted  Abd:    NTND per patient report  Lymph:    Swelling R 2nd MPJ/toe  Vasc:    Pulses palpable, CFT minimally delayed  Neuro:    Light touch  sensation intact to all sensory nerve distributions without paresthesias  Derm:    Neg for nodules, lesions or ulcerations  MSK:    Right lower extremity - swelling R mid/forefoot, jin 2nd MPJ/toe.  2nd MPJ ROM very limited, spurring around joint.  2nd toe is very tender at level of proximal phalanx.  Calf:    Neg for redness, swelling or tenderness      Imaging:  IMPRESSION: Second metatarsal head deformity which may be related to  chronic Freiberg's infraction. There is a new suture anchor in the  second metatarsal head compared to 11/26/2012. First metatarsal screws  have been removed. No evidence of acute fracture or dislocation.   By my read, midfoot calcifications noted, althugh similar to previous films.      Assessment:  44 year old female with continued pain/swelling 2nd MPJ/toe with Freiberg's after previous bunion/hammertoe surgery      Plan:  Discussed etiologies, anatomy and options  1.  continued pain/swelling 2nd MPJ/toe with Freiberg's after previous bunion/hammertoe surgery  -personally reviewed imaging with patient  -conservatively, discussed RICE/NSAID, compression, shoes/inserts  -surgically discussed addressing 2nd MPJ with joint debridement and likely hammertoe procedure.  I anticipate the joint is likely going to be stiff and tender even with corrective surgery, as the metatarsal head shows advanced DJD  -MRI to further assess 2nd MPJ/met head, and midfoot calcifications, will call with results    Job duties; time off work - LPN  Smoking history - neg  Vit D - draw prior to surgery  Diabetic/A1c - neg  Hemoglobin - draw prior to surgery  Clot history - neg  Allergies to surgical implants/suture - neg  Allergies/issues with narcotics - neg    Procedure(s):  1.  2nd MPJ debridement with metatarsal osteotomy and possible hammertoe  Consent: above  Diagnosis:  Freiber's   Equipment/Vendor: [employer]                Follow up:  prn or sooner with acute issues      Patient's medical history was  reviewed today    Body mass index is 33.91 kg/m .  Weight management plan: Patient was referred to their PCP to discuss a diet and exercise plan.        Lauri Ramirez DPM FAC[facility] FACFAOM  Podiatric Foot & Ankle Surgeon  [facility]  [phone]

## 2020-02-21 ENCOUNTER — HOSPITAL ENCOUNTER (OUTPATIENT)
Dept: MRI IMAGING | Facility: CLINIC | Age: 45
Discharge: HOME | End: 2020-02-21
Attending: PODIATRIST | Admitting: PODIATRIST
Payer: COMMERCIAL

## 2020-02-21 DIAGNOSIS — M79.674 TOE PAIN, RIGHT: ICD-10-CM

## 2020-02-21 PROCEDURE — 73718 MRI LOWER EXTREMITY W/O DYE: CPT | Mod: RT

## 2020-02-23 ENCOUNTER — HEALTH MAINTENANCE LETTER (OUTPATIENT)
Age: 45
End: 2020-02-23

## 2020-02-25 ENCOUNTER — TELEPHONE (OUTPATIENT)
Dept: PODIATRY | Facility: CLINIC | Age: 45
End: 2020-02-25

## 2020-02-25 NOTE — TELEPHONE ENCOUNTER
I called to discuss MRI results, LVM for her to return call to clinic.    Lauri Ramirez DPM FACFAS FACFAOM  Podiatric Foot & Ankle Surgeon  [facility]  [phone]

## 2020-03-02 ENCOUNTER — HOSPITAL ENCOUNTER (OUTPATIENT)
Dept: CARDIOLOGY | Facility: CLINIC | Age: 45
Discharge: HOME | End: 2020-03-02
Attending: NURSE PRACTITIONER | Admitting: NURSE PRACTITIONER
Payer: COMMERCIAL

## 2020-03-02 DIAGNOSIS — R07.9 CHEST PAIN, UNSPECIFIED TYPE: ICD-10-CM

## 2020-03-02 PROCEDURE — 0298T ZIO PATCH HOLTER ADULT PEDIATRIC GREATER THAN 48 HRS: CPT | Performed by: INTERNAL MEDICINE

## 2020-03-02 PROCEDURE — 0296T ZIO PATCH HOLTER ADULT PEDIATRIC GREATER THAN 48 HRS: CPT

## 2020-03-23 ENCOUNTER — TELEPHONE (OUTPATIENT)
Dept: INTERNAL MEDICINE | Facility: CLINIC | Age: 45
End: 2020-03-23

## 2020-03-23 NOTE — TELEPHONE ENCOUNTER
Please advise pt Zio monitor showed only 1 premature beat.  Please contact your PCP if you have further concerns  Elsa Arambula CNP

## 2020-03-24 NOTE — TELEPHONE ENCOUNTER
Call to pt and left message that Monitor results were OK. And to check her MyChart message or call clinic with questions.

## 2020-07-17 ENCOUNTER — NURSE TRIAGE (OUTPATIENT)
Dept: NURSING | Facility: CLINIC | Age: 45
End: 2020-07-17

## 2020-07-17 NOTE — TELEPHONE ENCOUNTER
Triage call:     Left chest - Feels like squeezing- last felt yesterday- not currently feeling  Denies sob of breath- no referred pain- no sweating - no nausea- no dizziness   Last a few seconds and go away   - feels like her heart is jumping around at times   - she states that she has been noticing these brief episodes more often since March.   - no additional symptoms at this time  She was advised to be seen with her PCP in March after wearing a Holter monitor  Patient would like to establish with a PCP    COVID 19 Nurse Triage Plan/Patient Instructions    Please be aware that novel coronavirus (COVID-19) may be circulating in the community. If you develop symptoms such as fever, cough, or SOB or if you have concerns about the presence of another infection including coronavirus (COVID-19), please contact your health care provider or visit [email or web address].     Disposition/Instructions    In-Person Visit with provider recommended. Reference Visit Selection Guide.    Thank you for taking steps to prevent the spread of this virus.  o Limit your contact with others.  o Wear a simple mask to cover your cough.  o Wash your hands well and often.    Resources    [facility]: About COVID-19: [email or web address]    CDC: What to Do If You're Sick: www.cdc.gov/coronavirus/2019-ncov/about/steps-when-sick.html    CDC: Ending Home Isolation: www.cdc.gov/coronavirus/2019-ncov/hcp/disposition-in-home-patients.html     CDC: Caring for Someone: www.cdc.gov/coronavirus/2019-ncov/if-you-are-sick/care-for-someone.html     [facility]: Interim Guidance for Hospital Discharge to Home: www.health.[facility].mn.us/diseases/coronavirus/hcp/hospdischarge.pdf    [facility] clinical trials (COVID-19 research studies): clinicalaffairs.[facility]/umn-clinical-trials     Below are the COVID-19 hotlines at the [facility] ([facility]). Interpreters are available.   o For health questions: Call 278-618-7094 or  "1-530.832.2456 (7 a.m. to 7 p.m.)  o For questions about schools and childcare: Call 486-187-4225 or 1-679.719.9219 (7 a.m. to 7 p.m.)     Domonique Barros RN BSN 7/17/2020 7:33 AM      Additional Information    Negative: Severe difficulty breathing (e.g., struggling for each breath, speaks in single words)    Negative: Difficult to awaken or acting confused (e.g., disoriented, slurred speech)    Negative: Shock suspected (e.g., cold/pale/clammy skin, too weak to stand, low BP, rapid pulse)    Negative: [1] Chest pain lasts > 5 minutes AND [2] history of heart disease  (i.e., heart attack, bypass surgery, angina, angioplasty, CHF; not just a heart murmur)    Negative: [1] Chest pain lasts > 5 minutes AND [2] described as crushing, pressure-like, or heavy    Negative: [1] Chest pain lasts > 5 minutes AND [2] age > 50    Negative: [1] Chest pain lasts > 5 minutes AND [2] age > 30 AND [3] at least one cardiac risk factor (i.e., hypertension, diabetes, obesity, smoker or strong family history of heart disease)    Negative: [1] Chest pain lasts > 5 minutes AND [2] not relieved with nitroglycerin    Negative: Passed out (i.e., lost consciousness, collapsed and was not responding)    Negative: Heart beating < 50 beats per minute OR > 140 beats per minute    Negative: Visible sweat on face or sweat dripping down face    Negative: Sounds like a life-threatening emergency to the triager    Negative: Followed a chest injury    Negative: SEVERE chest pain    Negative: [1] Intermittent  chest pain or \"angina\" AND [2] increasing in severity or frequency  (Exception: pains lasting a few seconds)    Negative: Pain also present in shoulder(s) or arm(s) or jaw  (Exception: pain is clearly made worse by movement)    Negative: Difficulty breathing    Negative: Dizziness or lightheadedness    Negative: Coughing up blood    Negative: Cocaine use within last 3 days    Negative: History of prior \"blood clot\" in leg or lungs (i.e., deep vein " "thrombosis, pulmonary embolism)    Negative: Recent illness requiring prolonged bedrest (i.e., immobilization)    Negative: Hip or leg fracture in past 2 months (e.g., had cast on leg or ankle)    Negative: Major surgery in the past month    Negative: Recent long-distance travel with prolonged time in car, bus, plane, or train (i.e., within past 2 weeks; 6 or  more hours duration)    Negative: Chest pain lasts > 5 minutes (Exceptions: chest pain occurring > 3 days ago and now asymptomatic; same as previously diagnosed heartburn and has accompanying sour taste in mouth)    Negative: Taking a deep breath makes pain worse    Negative: Patient sounds very sick or weak to the triager    Negative: [1] Chest pain lasts > 5 minutes AND [2] occurred > 3 days ago (72 hours) AND [3] NO chest pain or cardiac symptoms now    Negative: [1] Chest pain lasting <= 5 minutes AND [2] NO chest pain or cardiac symptoms now(Exceptions: pains lasting a few seconds)    Negative: Fever > 100.5 F (38.1 C)    Negative: Rash in same area as pain (may be described as \"small blisters\")    Negative: [1] Patient claims chest pain is same as previously diagnosed \"heartburn\" AND [2] describes burning in chest AND [3] accompanying sour taste in mouth    Negative: [1] Chest pain lasting <= 5 minutes AND [2] has not taken prescribed nitroglycerin    Negative: [1] Chest pain lasting <= 5 minutes AND [2] completely relieved by nitroglycerin    Negative: [1] Intermittent chest pain from \"angina\" AND [2] NO increase in severity or frequency    Negative: Chest pain(s) lasting a few seconds from coughing AND [2] persists > 3 days    [1] Chest pain(s) lasting a few seconds AND [2] persists > 3 days    Protocols used: CHEST PAIN-A-AH      "

## 2020-07-21 ENCOUNTER — VIRTUAL VISIT (OUTPATIENT)
Dept: INTERNAL MEDICINE | Facility: CLINIC | Age: 45
End: 2020-07-21
Payer: COMMERCIAL

## 2020-07-21 DIAGNOSIS — D64.9 ANEMIA, UNSPECIFIED TYPE: ICD-10-CM

## 2020-07-21 DIAGNOSIS — R06.02 SOB (SHORTNESS OF BREATH) ON EXERTION: ICD-10-CM

## 2020-07-21 DIAGNOSIS — R07.9 CHEST PAIN, UNSPECIFIED TYPE: Primary | ICD-10-CM

## 2020-07-21 PROCEDURE — 99214 OFFICE O/P EST MOD 30 MIN: CPT | Mod: 95 | Performed by: INTERNAL MEDICINE

## 2020-07-21 RX ORDER — BACLOFEN 10 MG/1
10-20 TABLET ORAL
Qty: 60 TABLET | Refills: 1 | Status: SHIPPED | OUTPATIENT
Start: 2020-07-21 | End: 2024-04-25

## 2020-07-21 NOTE — PROGRESS NOTES
"Toyin Andrew is a 44 year old female who is being evaluated via a billable video visit.      The patient has been notified of following:     \"This video visit will be conducted via a call between you and your physician/provider. We have found that certain health care needs can be provided without the need for an in-person physical exam.  This service lets us provide the care you need with a video conversation.  If a prescription is necessary we can send it directly to your pharmacy.  If lab work is needed we can place an order for that and you can then stop by our lab to have the test done at a later time.    Video visits are billed at different rates depending on your insurance coverage.  Please reach out to your insurance provider with any questions.    If during the course of the call the physician/provider feels a video visit is not appropriate, you will not be charged for this service.\"    Patient has given verbal consent for Video visit? Yes  How would you like to obtain your AVS? MyChart  If you are dropped from the video visit, the video invite should be resent to: Text to cell phone: [phone]  Will anyone else be joining your video visit? No            This is a VIDEO ( using Doximity)  encounter with the patient.       Location of the provider : home   Location of the patient : home        08:45 --- 09:10          Dr Daugherty's note      Patient's instructions / PLAN:                                                        Plan:  1. Baclofen 10 mg tablet ( muscle relaxant). Take 1-2 tablet at bed time.Can cause drowsiness   2. Voltaren gel to the painful area 3 times a day as needed   3. Chest XRay - [phone]  4. Call clinic [phone] and schedule annual/physical exam   5. Please make a lab appointment for nonfasting labs  ( for anemia)      ASSESSMENT & PLAN:                                                      (R07.9) Chest pain, unspecified type  (primary encounter diagnosis)  (R06.02) SOB " (shortness of breath) on exertion  Comment: neg cardio work up last year  Plan: as above     (D64.9) Anemia, unspecified type  Comment:   Plan: as above        Chief complaint:                                                      CP  Anemia      SUBJECTIVE:                                                    History of present illness:    L chest pain  -- she feels it as heaviness. She points it from L supraclavicular area to lower chest   -- not daily  -- she feels it like little tightness, squeezing when she walks. She stops and she rests for a little bit it goes away.    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  Those pertinent are above and in the in the Subjective section.  The rest of the systems are negative.           OBJECTIVE:           An actual physical exam can't be done during phone visit   A limited exam can sometimes be performed by video visit       PMHx: reviewed  Past Medical History:   Diagnosis Date     Anemia     currently taking iron     History of colposcopy with cervical biopsy 11    VONDA I     HSIL (high grade squamous intraepithelial lesion) on Pap smear 11     Pap smear of cervix with ASCUS, cannot exclude HGSIL 13     Uterine fibroid       PSHx: reviewed  Past Surgical History:   Procedure Laterality Date     BIOPSY OF BREAST, INCISIONAL      left breast biopsy - negative      SECTION  2013    Procedure:   SECTION;  primary  section ;  Surgeon: Linda Borja MD;  Location: [facility] L[facility]     SURGICAL HISTORY OF -       uterine fibroids     SURGICAL HISTORY OF -       right foot lump removal        Meds: reviewed  Current Outpatient Medications   Medication Sig Dispense Refill     ferrous gluconate (FERGON) 324 (38 Fe) MG tablet Take 1 tablet (324 mg) by mouth daily (with breakfast)  0       Soc Hx: reviewed  Fam Hx: reviewed          Amym Daugherty MD  Internal Medicine

## 2020-07-21 NOTE — PATIENT INSTRUCTIONS
Plan:  1. Baclofen 10 mg tablet ( muscle relaxant). Take 1-2 tablet at bed time.Can cause drowsiness   2. Voltaren gel to the painful area 3 times a day as needed   3. Chest XRay - [phone]  4. Call clinic [phone] and schedule annual/physical exam   5. Please make a lab appointment for nonfasting labs  ( for anemia) [phone]

## 2020-07-27 ENCOUNTER — TELEPHONE (OUTPATIENT)
Dept: INTERNAL MEDICINE | Facility: CLINIC | Age: 45
End: 2020-07-27

## 2020-07-27 NOTE — TELEPHONE ENCOUNTER
Patient calling  At her video appt she was told to get a chest xray but there are no orders. Please advise  Ok to call and sheridan [phone]

## 2020-07-28 NOTE — TELEPHONE ENCOUNTER
Left message on patient's voicemail stating the order has been placed along with the phone number to call clinic to schedule.  PAMELA Ling R.N.

## 2020-12-06 ENCOUNTER — HEALTH MAINTENANCE LETTER (OUTPATIENT)
Age: 45
End: 2020-12-06

## 2021-01-28 ENCOUNTER — APPOINTMENT (OUTPATIENT)
Dept: CT IMAGING | Facility: CLINIC | Age: 46
End: 2021-01-28
Attending: NURSE PRACTITIONER
Payer: COMMERCIAL

## 2021-01-28 ENCOUNTER — HOSPITAL ENCOUNTER (EMERGENCY)
Facility: CLINIC | Age: 46
Discharge: HOME | End: 2021-01-28
Attending: NURSE PRACTITIONER | Admitting: NURSE PRACTITIONER
Payer: COMMERCIAL

## 2021-01-28 ENCOUNTER — APPOINTMENT (OUTPATIENT)
Dept: ULTRASOUND IMAGING | Facility: CLINIC | Age: 46
End: 2021-01-28
Attending: NURSE PRACTITIONER
Payer: COMMERCIAL

## 2021-01-28 VITALS
RESPIRATION RATE: 16 BRPM | TEMPERATURE: 98.9 F | HEART RATE: 62 BPM | DIASTOLIC BLOOD PRESSURE: 82 MMHG | SYSTOLIC BLOOD PRESSURE: 123 MMHG | OXYGEN SATURATION: 100 %

## 2021-01-28 DIAGNOSIS — D25.9 FIBROID UTERUS: ICD-10-CM

## 2021-01-28 DIAGNOSIS — R10.31 ABDOMINAL PAIN, RIGHT LOWER QUADRANT: ICD-10-CM

## 2021-01-28 PROBLEM — I49.3 PVC (PREMATURE VENTRICULAR CONTRACTION): Status: ACTIVE | Noted: 2020-08-26

## 2021-01-28 LAB
ALBUMIN SERPL-MCNC: 3.5 G/DL (ref 3.4–5)
ALBUMIN UR-MCNC: 20 MG/DL
ALP SERPL-CCNC: 75 U/L (ref 40–150)
ALT SERPL W P-5'-P-CCNC: 14 U/L (ref 0–50)
ANION GAP SERPL CALCULATED.3IONS-SCNC: 3 MMOL/L (ref 3–14)
APPEARANCE UR: ABNORMAL
AST SERPL W P-5'-P-CCNC: 15 U/L (ref 0–45)
B-HCG FREE SERPL-ACNC: <5 IU/L
BASOPHILS # BLD AUTO: 0 10E9/L (ref 0–0.2)
BASOPHILS NFR BLD AUTO: 0.5 %
BILIRUB SERPL-MCNC: 0.3 MG/DL (ref 0.2–1.3)
BILIRUB UR QL STRIP: NEGATIVE
BUN SERPL-MCNC: 5 MG/DL (ref 7–30)
CALCIUM SERPL-MCNC: 9.6 MG/DL (ref 8.5–10.1)
CHLORIDE SERPL-SCNC: 108 MMOL/L (ref 94–109)
CO2 SERPL-SCNC: 29 MMOL/L (ref 20–32)
COLOR UR AUTO: YELLOW
CREAT SERPL-MCNC: 0.7 MG/DL (ref 0.52–1.04)
DIFFERENTIAL METHOD BLD: ABNORMAL
EOSINOPHIL # BLD AUTO: 0 10E9/L (ref 0–0.7)
EOSINOPHIL NFR BLD AUTO: 1 %
ERYTHROCYTE [DISTWIDTH] IN BLOOD BY AUTOMATED COUNT: 13.4 % (ref 10–15)
GFR SERPL CREATININE-BSD FRML MDRD: >90 ML/MIN/{1.73_M2}
GLUCOSE SERPL-MCNC: 95 MG/DL (ref 70–99)
GLUCOSE UR STRIP-MCNC: NEGATIVE MG/DL
HCT VFR BLD AUTO: 37.9 % (ref 35–47)
HGB BLD-MCNC: 11.9 G/DL (ref 11.7–15.7)
HGB UR QL STRIP: NEGATIVE
IMM GRANULOCYTES # BLD: 0 10E9/L (ref 0–0.4)
IMM GRANULOCYTES NFR BLD: 0 %
KETONES UR STRIP-MCNC: NEGATIVE MG/DL
LEUKOCYTE ESTERASE UR QL STRIP: NEGATIVE
LIPASE SERPL-CCNC: 81 U/L (ref 73–393)
LYMPHOCYTES # BLD AUTO: 1.1 10E9/L (ref 0.8–5.3)
LYMPHOCYTES NFR BLD AUTO: 28.2 %
MCH RBC QN AUTO: 28.9 PG (ref 26.5–33)
MCHC RBC AUTO-ENTMCNC: 31.4 G/DL (ref 31.5–36.5)
MCV RBC AUTO: 92 FL (ref 78–100)
MONOCYTES # BLD AUTO: 0.4 10E9/L (ref 0–1.3)
MONOCYTES NFR BLD AUTO: 10.3 %
MUCOUS THREADS #/AREA URNS LPF: PRESENT /LPF
NEUTROPHILS # BLD AUTO: 2.3 10E9/L (ref 1.6–8.3)
NEUTROPHILS NFR BLD AUTO: 60 %
NITRATE UR QL: NEGATIVE
NRBC # BLD AUTO: 0 10*3/UL
NRBC BLD AUTO-RTO: 0 /100
PH UR STRIP: 6 PH (ref 5–7)
PLATELET # BLD AUTO: 242 10E9/L (ref 150–450)
POTASSIUM SERPL-SCNC: 3.9 MMOL/L (ref 3.4–5.3)
PROT SERPL-MCNC: 7.6 G/DL (ref 6.8–8.8)
RBC # BLD AUTO: 4.12 10E12/L (ref 3.8–5.2)
RBC #/AREA URNS AUTO: 1 /HPF (ref 0–2)
SODIUM SERPL-SCNC: 140 MMOL/L (ref 133–144)
SOURCE: ABNORMAL
SP GR UR STRIP: 1.02 (ref 1–1.03)
SQUAMOUS #/AREA URNS AUTO: 6 /HPF (ref 0–1)
UROBILINOGEN UR STRIP-MCNC: NORMAL MG/DL (ref 0–2)
WBC # BLD AUTO: 3.9 10E9/L (ref 4–11)
WBC #/AREA URNS AUTO: 2 /HPF (ref 0–5)

## 2021-01-28 PROCEDURE — 84702 CHORIONIC GONADOTROPIN TEST: CPT

## 2021-01-28 PROCEDURE — 76830 TRANSVAGINAL US NON-OB: CPT

## 2021-01-28 PROCEDURE — 83690 ASSAY OF LIPASE: CPT | Performed by: NURSE PRACTITIONER

## 2021-01-28 PROCEDURE — 250N000011 HC RX IP 250 OP 636: Performed by: NURSE PRACTITIONER

## 2021-01-28 PROCEDURE — 250N000009 HC RX 250: Performed by: NURSE PRACTITIONER

## 2021-01-28 PROCEDURE — 74177 CT ABD & PELVIS W/CONTRAST: CPT

## 2021-01-28 PROCEDURE — 85025 COMPLETE CBC W/AUTO DIFF WBC: CPT | Performed by: NURSE PRACTITIONER

## 2021-01-28 PROCEDURE — 99285 EMERGENCY DEPT VISIT HI MDM: CPT | Mod: 25

## 2021-01-28 PROCEDURE — 81001 URINALYSIS AUTO W/SCOPE: CPT | Performed by: NURSE PRACTITIONER

## 2021-01-28 PROCEDURE — 80053 COMPREHEN METABOLIC PANEL: CPT | Performed by: NURSE PRACTITIONER

## 2021-01-28 PROCEDURE — 96374 THER/PROPH/DIAG INJ IV PUSH: CPT

## 2021-01-28 RX ORDER — HYDROCODONE BITARTRATE AND ACETAMINOPHEN 5; 325 MG/1; MG/1
1 TABLET ORAL EVERY 6 HOURS PRN
Qty: 10 TABLET | Refills: 0 | Status: SHIPPED | OUTPATIENT
Start: 2021-01-28 | End: 2021-01-31

## 2021-01-28 RX ORDER — KETOROLAC TROMETHAMINE 15 MG/ML
15 INJECTION, SOLUTION INTRAMUSCULAR; INTRAVENOUS ONCE
Status: COMPLETED | OUTPATIENT
Start: 2021-01-28 | End: 2021-01-28

## 2021-01-28 RX ORDER — IOPAMIDOL 755 MG/ML
500 INJECTION, SOLUTION INTRAVASCULAR ONCE
Status: COMPLETED | OUTPATIENT
Start: 2021-01-28 | End: 2021-01-28

## 2021-01-28 RX ADMIN — KETOROLAC TROMETHAMINE 15 MG: 15 INJECTION, SOLUTION INTRAMUSCULAR; INTRAVENOUS at 12:52

## 2021-01-28 RX ADMIN — IOPAMIDOL 100 ML: 755 INJECTION, SOLUTION INTRAVENOUS at 13:16

## 2021-01-28 RX ADMIN — SODIUM CHLORIDE 65 ML: 9 INJECTION, SOLUTION INTRAVENOUS at 13:16

## 2021-01-28 ASSESSMENT — ENCOUNTER SYMPTOMS
ABDOMINAL PAIN: 1
MYALGIAS: 1
BACK PAIN: 1
FEVER: 0
WEAKNESS: 0
NUMBNESS: 0
SHORTNESS OF BREATH: 0

## 2021-01-28 NOTE — ED TRIAGE NOTES
RLQ abdominal pain since 0300 which radiates to her back and down her right leg. VSS on RA. Pt Covid + 1/5/2021. Reports improvement in viral symptoms.

## 2021-01-28 NOTE — ED PROVIDER NOTES
History   Chief Complaint:  Abdominal Pain     AQUILES Andrew is a 45 year old female with history of anemia and uterine leiomyomas who presents with lower right-sided abdominal pain that began about 14 days ago. The patient reports that 2 weeks ago she began having lower right abdomen pain that was initially intermittent, but has grown constant over the past 2 days. This pain radiates up to her back and down into her right leg. She has a history of fibroids and has had a prior myomectomy and . Currently her pain is a 6/10 and she has not taken any medications for her pain. She otherwise denies any fever, numbness, weakness, tingling or urinary symptoms.     Review of Systems   Constitutional: Negative for fever.   Respiratory: Negative for shortness of breath.    Cardiovascular: Negative for chest pain.   Gastrointestinal: Positive for abdominal pain.   Genitourinary: Negative.    Musculoskeletal: Positive for back pain and myalgias.   Neurological: Negative for weakness and numbness.   All other systems reviewed and are negative.      Allergies:  The patient has no known allergies.     Medications:  Lioresal  Fergon    Past Medical History:    Anemia  HSIL  Pap smear with ASCUS  Toe deformity  Uterine leiomyoma  PVC  Obesity    Past Surgical History:    Breast biopsy    Uterine fibroid surgery  Right foot lumpectomy  Breast cyst excision    Family History:    Father: diabetes, hypertension  Mother: diabetes, hypertension    Social History:  The patient presents to the emergency department alone.   The patient denies any history of smoking or alcohol use.     Physical Exam     Patient Vitals for the past 24 hrs:   BP Temp Pulse Resp SpO2   21 1300 111/75 -- 64 -- 100 %   21 1135 132/80 98.9  F (37.2  C) 74 17 100 %       Physical Exam  General: Alert, Mild  discomfort, well kept  Eyes: PERRL, conjunctivae pink no scleral icterus or conjunctival injection  ENT:   Moist mucus  membranes, posterior oropharynx clear without erythema or exudates, No lymphadenopathy, Normal voice  Resp:  Lungs clear to auscultation bilaterally, no crackles/rubs/wheezes. Good air movement  CV:  Normal rate and rhythm, no murmurs/rubs/gallops  GI:  Abdomen soft and non-distended.  Normoactive BS.   Mild suprapubic/RLQ tenderness, No guarding or rebound, No masses  Skin:  Warm, dry.  No rashes or petechiae  Musculoskeletal: No peripheral edema or calf tenderness, Normal gross ROM   Neuro: Alert and oriented to person/place/time, normal sensation  Psychiatric: Normal affect, cooperative, good eye contact    Emergency Department Course     Imaging:  CT Abdomen Pelvis W Contrast  1.  Enlarged fibroid uterus has increased in size compared to 2009.  2.  No acute abnormality demonstrated.  Reading per radiology.    US Pelvis Complete W Transvaginal & Doppler LmtPel Duplex Limited  Enlarged uterus with numerous prominent fibroids  throughout the myometrium. Endometrial stripe and ovaries are  obscured. No definite free pelvic fluid.   Reading per radiology.    Laboratory:  CBC: WBC 3.9 (L) o/w WNL. (HGB 11.9, )   CMP: Urea Nitrogen 5 (L) o/w AWNL (Creatinine 0.70)    Lipase: 81     UA with micro: Protein Albumin 20, Squamous Epithelial 6 (H), Mucous present o/w negative   HCG Quantitative: <5.0     Emergency Department Course:    Reviewed:  I reviewed nursing notes, vitals, past medical history and care everywhere    Assessments:  1233 I obtained history and examined the patient as noted above.   1420 I rechecked the patient and explained findings.       Interventions:  1252 Toradol 15 mg IV    Disposition:  The patient was discharged to home.       Impression & Plan     Medical Decision Making:  Toyin Andrew is a 45 year old female who presents today for evaluation of abdominal discomfort.  She has had these symptoms going on for approximately 2 weeks however they were intermittent.  Over the last 2 days it has  been constant therefore she presented for evaluation.  She does have a history of a fibroid uterus and has had myomectomies previously.  Her examination showed mild tenderness in the suprapubic area.  Imagery was obtained as above which shows multiple large fibroids.  There is no evidence of other source of discomfort.  Most likely her fibroids are the source of her discomfort.  We discussed the need to follow-up with obstetrician.  She will contact her obstetrician tomorrow to schedule follow-up.  At this point time there is no indication for further testing or imagery.  Her laboratory studies were noncontributory.  She is not pregnant.  Return protocols were discussed.  She appears to be safe and appropriate for outpatient management follow-up and is discharged home.    Covid-19  Toyin Andrew was evaluated during a global COVID-19 pandemic, which necessitated consideration that the patient might be at risk for infection with the SARS-CoV-2 virus that causes COVID-19.   Applicable protocols for evaluation were followed during the patient's care.   COVID-19 was considered as part of the patient's evaluation. The plan for testing is:  a test was obtained at a previous visit and reviewed & considered today.    Diagnosis:    ICD-10-CM    1. Abdominal pain, right lower quadrant  R10.31    2. Fibroid uterus  D25.9        Discharge Medications:  New Prescriptions    HYDROCODONE-ACETAMINOPHEN (NORCO) 5-325 MG TABLET    Take 1 tablet by mouth every 6 hours as needed for severe pain         Scribe Disclosure:  Satish KELLEY, am serving as a scribe at 12:22 PM on 1/28/2021 to document services personally performed by Jairo Del Valle APRN* based on my observations and the provider's statements to me.          Jairo Del Valle APRN CNP  01/28/21 [id number]

## 2021-04-11 ENCOUNTER — HEALTH MAINTENANCE LETTER (OUTPATIENT)
Age: 46
End: 2021-04-11

## 2021-09-25 ENCOUNTER — HEALTH MAINTENANCE LETTER (OUTPATIENT)
Age: 46
End: 2021-09-25

## 2022-01-15 ENCOUNTER — HEALTH MAINTENANCE LETTER (OUTPATIENT)
Age: 47
End: 2022-01-15

## 2023-01-07 ENCOUNTER — HEALTH MAINTENANCE LETTER (OUTPATIENT)
Age: 48
End: 2023-01-07

## 2023-04-22 ENCOUNTER — HEALTH MAINTENANCE LETTER (OUTPATIENT)
Age: 48
End: 2023-04-22

## 2024-04-25 RX ORDER — MULTIVITAMIN WITH IRON
1 TABLET ORAL DAILY
COMMUNITY

## 2024-04-29 NOTE — PHARMACY-ADMISSION MEDICATION HISTORY
Medication reconciliation interview completed by pre-admitting nurse on 4/25/2024, reviewed by pharmacist. No further clarifications needed.     Nurse Complete: Anisha Leger RN on u Apr 25, 2024 at 1:32 PM    PTA Med List   Medication Sig Last Dose    ferrous gluconate (FERGON) 324 (38 Fe) MG tablet Take 1 tablet (324 mg) by mouth daily (with breakfast)

## 2024-05-03 ENCOUNTER — ANESTHESIA EVENT (OUTPATIENT)
Dept: SURGERY | Facility: CLINIC | Age: 49
DRG: 742 | End: 2024-05-03
Payer: COMMERCIAL

## 2024-05-03 ENCOUNTER — ANESTHESIA (OUTPATIENT)
Dept: SURGERY | Facility: CLINIC | Age: 49
DRG: 742 | End: 2024-05-03
Payer: COMMERCIAL

## 2024-05-03 ENCOUNTER — HOSPITAL ENCOUNTER (INPATIENT)
Facility: CLINIC | Age: 49
LOS: 2 days | Discharge: HOME | DRG: 742 | End: 2024-05-05
Attending: OBSTETRICS & GYNECOLOGY | Admitting: OBSTETRICS & GYNECOLOGY
Payer: COMMERCIAL

## 2024-05-03 DIAGNOSIS — Z90.710 S/P ABDOMINAL HYSTERECTOMY: Primary | ICD-10-CM

## 2024-05-03 LAB
ABO/RH(D): NORMAL
ANTIBODY SCREEN: NEGATIVE
APTT PPP: 26 SECONDS (ref 22–38)
BASOPHILS # BLD AUTO: 0 10E3/UL (ref 0–0.2)
BASOPHILS NFR BLD AUTO: 1 %
EOSINOPHIL # BLD AUTO: 0 10E3/UL (ref 0–0.7)
EOSINOPHIL NFR BLD AUTO: 1 %
ERYTHROCYTE [DISTWIDTH] IN BLOOD BY AUTOMATED COUNT: 19.4 % (ref 10–15)
HCG UR QL: NEGATIVE
HCT VFR BLD AUTO: 31.6 % (ref 35–47)
HGB BLD-MCNC: 10.1 G/DL (ref 11.7–15.7)
IMM GRANULOCYTES # BLD: 0 10E3/UL
IMM GRANULOCYTES NFR BLD: 0 %
INR PPP: 1.17 (ref 0.85–1.15)
LYMPHOCYTES # BLD AUTO: 1.3 10E3/UL (ref 0.8–5.3)
LYMPHOCYTES NFR BLD AUTO: 23 %
MCH RBC QN AUTO: 27.7 PG (ref 26.5–33)
MCHC RBC AUTO-ENTMCNC: 32 G/DL (ref 31.5–36.5)
MCV RBC AUTO: 87 FL (ref 78–100)
MONOCYTES # BLD AUTO: 0.3 10E3/UL (ref 0–1.3)
MONOCYTES NFR BLD AUTO: 5 %
NEUTROPHILS # BLD AUTO: 4.2 10E3/UL (ref 1.6–8.3)
NEUTROPHILS NFR BLD AUTO: 70 %
NRBC # BLD AUTO: 0 10E3/UL
NRBC BLD AUTO-RTO: 0 /100
PLATELET # BLD AUTO: 204 10E3/UL (ref 150–450)
RBC # BLD AUTO: 3.64 10E6/UL (ref 3.8–5.2)
SPECIMEN EXPIRATION DATE: NORMAL
WBC # BLD AUTO: 5.8 10E3/UL (ref 4–11)

## 2024-05-03 PROCEDURE — 85025 COMPLETE CBC W/AUTO DIFF WBC: CPT | Performed by: OBSTETRICS & GYNECOLOGY

## 2024-05-03 PROCEDURE — 258N000001 HC RX 258: Performed by: OBSTETRICS & GYNECOLOGY

## 2024-05-03 PROCEDURE — 250N000009 HC RX 250: Performed by: NURSE ANESTHETIST, CERTIFIED REGISTERED

## 2024-05-03 PROCEDURE — 360N000077 HC SURGERY LEVEL 4, PER MIN: Performed by: OBSTETRICS & GYNECOLOGY

## 2024-05-03 PROCEDURE — 250N000011 HC RX IP 250 OP 636: Performed by: OBSTETRICS & GYNECOLOGY

## 2024-05-03 PROCEDURE — 88305 TISSUE EXAM BY PATHOLOGIST: CPT | Mod: 26 | Performed by: PATHOLOGY

## 2024-05-03 PROCEDURE — 88307 TISSUE EXAM BY PATHOLOGIST: CPT | Mod: 26 | Performed by: PATHOLOGY

## 2024-05-03 PROCEDURE — 250N000011 HC RX IP 250 OP 636: Performed by: NURSE ANESTHETIST, CERTIFIED REGISTERED

## 2024-05-03 PROCEDURE — 258N000003 HC RX IP 258 OP 636: Performed by: ANESTHESIOLOGY

## 2024-05-03 PROCEDURE — 250N000011 HC RX IP 250 OP 636: Performed by: ANESTHESIOLOGY

## 2024-05-03 PROCEDURE — 85610 PROTHROMBIN TIME: CPT | Performed by: OBSTETRICS & GYNECOLOGY

## 2024-05-03 PROCEDURE — 88311 DECALCIFY TISSUE: CPT | Mod: 26 | Performed by: PATHOLOGY

## 2024-05-03 PROCEDURE — 88311 DECALCIFY TISSUE: CPT | Mod: TC | Performed by: OBSTETRICS & GYNECOLOGY

## 2024-05-03 PROCEDURE — 0UT90ZZ RESECTION OF UTERUS, OPEN APPROACH: ICD-10-PCS | Performed by: OBSTETRICS & GYNECOLOGY

## 2024-05-03 PROCEDURE — 250N000011 HC RX IP 250 OP 636: Mod: JZ | Performed by: OBSTETRICS & GYNECOLOGY

## 2024-05-03 PROCEDURE — 120N000001 HC R&B MED SURG/OB

## 2024-05-03 PROCEDURE — 710N000009 HC RECOVERY PHASE 1, LEVEL 1, PER MIN: Performed by: OBSTETRICS & GYNECOLOGY

## 2024-05-03 PROCEDURE — 250N000013 HC RX MED GY IP 250 OP 250 PS 637: Performed by: OBSTETRICS & GYNECOLOGY

## 2024-05-03 PROCEDURE — 272N000001 HC OR GENERAL SUPPLY STERILE: Performed by: OBSTETRICS & GYNECOLOGY

## 2024-05-03 PROCEDURE — 250N000025 HC SEVOFLURANE, PER MIN: Performed by: OBSTETRICS & GYNECOLOGY

## 2024-05-03 PROCEDURE — 370N000017 HC ANESTHESIA TECHNICAL FEE, PER MIN: Performed by: OBSTETRICS & GYNECOLOGY

## 2024-05-03 PROCEDURE — 999N000141 HC STATISTIC PRE-PROCEDURE NURSING ASSESSMENT: Performed by: OBSTETRICS & GYNECOLOGY

## 2024-05-03 PROCEDURE — 81025 URINE PREGNANCY TEST: CPT | Performed by: OBSTETRICS & GYNECOLOGY

## 2024-05-03 PROCEDURE — 0UT70ZZ RESECTION OF BILATERAL FALLOPIAN TUBES, OPEN APPROACH: ICD-10-PCS | Performed by: OBSTETRICS & GYNECOLOGY

## 2024-05-03 PROCEDURE — 85730 THROMBOPLASTIN TIME PARTIAL: CPT | Performed by: OBSTETRICS & GYNECOLOGY

## 2024-05-03 PROCEDURE — 36415 COLL VENOUS BLD VENIPUNCTURE: CPT | Performed by: OBSTETRICS & GYNECOLOGY

## 2024-05-03 PROCEDURE — 86900 BLOOD TYPING SEROLOGIC ABO: CPT | Performed by: OBSTETRICS & GYNECOLOGY

## 2024-05-03 PROCEDURE — 258N000003 HC RX IP 258 OP 636: Performed by: NURSE ANESTHETIST, CERTIFIED REGISTERED

## 2024-05-03 RX ORDER — ONDANSETRON 2 MG/ML
4 INJECTION INTRAMUSCULAR; INTRAVENOUS EVERY 30 MIN PRN
Status: DISCONTINUED | OUTPATIENT
Start: 2024-05-03 | End: 2024-05-03 | Stop reason: HOSPADM

## 2024-05-03 RX ORDER — LABETALOL HYDROCHLORIDE 5 MG/ML
10 INJECTION, SOLUTION INTRAVENOUS
Status: DISCONTINUED | OUTPATIENT
Start: 2024-05-03 | End: 2024-05-03 | Stop reason: HOSPADM

## 2024-05-03 RX ORDER — PROPOFOL 10 MG/ML
INJECTION, EMULSION INTRAVENOUS PRN
Status: DISCONTINUED | OUTPATIENT
Start: 2024-05-03 | End: 2024-05-03

## 2024-05-03 RX ORDER — IBUPROFEN 600 MG/1
600 TABLET, FILM COATED ORAL EVERY 6 HOURS PRN
Status: DISCONTINUED | OUTPATIENT
Start: 2024-05-03 | End: 2024-05-05 | Stop reason: HOSPADM

## 2024-05-03 RX ORDER — FAMOTIDINE 20 MG/1
20 TABLET, FILM COATED ORAL 2 TIMES DAILY
Status: DISCONTINUED | OUTPATIENT
Start: 2024-05-03 | End: 2024-05-05 | Stop reason: HOSPADM

## 2024-05-03 RX ORDER — KETOROLAC TROMETHAMINE 30 MG/ML
INJECTION, SOLUTION INTRAMUSCULAR; INTRAVENOUS PRN
Status: DISCONTINUED | OUTPATIENT
Start: 2024-05-03 | End: 2024-05-03

## 2024-05-03 RX ORDER — NALOXONE HYDROCHLORIDE 0.4 MG/ML
0.1 INJECTION, SOLUTION INTRAMUSCULAR; INTRAVENOUS; SUBCUTANEOUS
Status: DISCONTINUED | OUTPATIENT
Start: 2024-05-03 | End: 2024-05-03 | Stop reason: HOSPADM

## 2024-05-03 RX ORDER — OXYCODONE HYDROCHLORIDE 5 MG/1
5-10 TABLET ORAL
Qty: 10 TABLET | Refills: 0 | Status: SHIPPED | OUTPATIENT
Start: 2024-05-03

## 2024-05-03 RX ORDER — NALOXONE HYDROCHLORIDE 0.4 MG/ML
0.4 INJECTION, SOLUTION INTRAMUSCULAR; INTRAVENOUS; SUBCUTANEOUS
Status: DISCONTINUED | OUTPATIENT
Start: 2024-05-03 | End: 2024-05-05 | Stop reason: HOSPADM

## 2024-05-03 RX ORDER — LIDOCAINE 40 MG/G
CREAM TOPICAL
Status: DISCONTINUED | OUTPATIENT
Start: 2024-05-03 | End: 2024-05-05 | Stop reason: HOSPADM

## 2024-05-03 RX ORDER — PROPOFOL 10 MG/ML
INJECTION, EMULSION INTRAVENOUS CONTINUOUS PRN
Status: DISCONTINUED | OUTPATIENT
Start: 2024-05-03 | End: 2024-05-03

## 2024-05-03 RX ORDER — OXYCODONE HYDROCHLORIDE 10 MG/1
10 TABLET ORAL EVERY 4 HOURS PRN
Status: DISCONTINUED | OUTPATIENT
Start: 2024-05-03 | End: 2024-05-05 | Stop reason: HOSPADM

## 2024-05-03 RX ORDER — ONDANSETRON 4 MG/1
4 TABLET, ORALLY DISINTEGRATING ORAL EVERY 6 HOURS PRN
Status: DISCONTINUED | OUTPATIENT
Start: 2024-05-03 | End: 2024-05-05 | Stop reason: HOSPADM

## 2024-05-03 RX ORDER — SODIUM CHLORIDE, SODIUM LACTATE, POTASSIUM CHLORIDE, CALCIUM CHLORIDE 600; 310; 30; 20 MG/100ML; MG/100ML; MG/100ML; MG/100ML
INJECTION, SOLUTION INTRAVENOUS CONTINUOUS
Status: DISCONTINUED | OUTPATIENT
Start: 2024-05-03 | End: 2024-05-03 | Stop reason: HOSPADM

## 2024-05-03 RX ORDER — HYDRALAZINE HYDROCHLORIDE 20 MG/ML
2.5-5 INJECTION INTRAMUSCULAR; INTRAVENOUS EVERY 10 MIN PRN
Status: DISCONTINUED | OUTPATIENT
Start: 2024-05-03 | End: 2024-05-03 | Stop reason: HOSPADM

## 2024-05-03 RX ORDER — CEFAZOLIN SODIUM/WATER 2 G/20 ML
2 SYRINGE (ML) INTRAVENOUS
Status: COMPLETED | OUTPATIENT
Start: 2024-05-03 | End: 2024-05-03

## 2024-05-03 RX ORDER — ACETAMINOPHEN 325 MG/1
975 TABLET ORAL ONCE
Status: COMPLETED | OUTPATIENT
Start: 2024-05-03 | End: 2024-05-03

## 2024-05-03 RX ORDER — PHENAZOPYRIDINE HYDROCHLORIDE 200 MG/1
200 TABLET, FILM COATED ORAL ONCE
Status: COMPLETED | OUTPATIENT
Start: 2024-05-03 | End: 2024-05-03

## 2024-05-03 RX ORDER — VASOPRESSIN 20 U/ML
INJECTION PARENTERAL PRN
Status: DISCONTINUED | OUTPATIENT
Start: 2024-05-03 | End: 2024-05-03 | Stop reason: HOSPADM

## 2024-05-03 RX ORDER — LIDOCAINE HYDROCHLORIDE 20 MG/ML
INJECTION, SOLUTION INFILTRATION; PERINEURAL PRN
Status: DISCONTINUED | OUTPATIENT
Start: 2024-05-03 | End: 2024-05-03

## 2024-05-03 RX ORDER — NALOXONE HYDROCHLORIDE 0.4 MG/ML
0.2 INJECTION, SOLUTION INTRAMUSCULAR; INTRAVENOUS; SUBCUTANEOUS
Status: DISCONTINUED | OUTPATIENT
Start: 2024-05-03 | End: 2024-05-05 | Stop reason: HOSPADM

## 2024-05-03 RX ORDER — FENTANYL CITRATE 50 UG/ML
50 INJECTION, SOLUTION INTRAMUSCULAR; INTRAVENOUS EVERY 5 MIN PRN
Status: DISCONTINUED | OUTPATIENT
Start: 2024-05-03 | End: 2024-05-03 | Stop reason: HOSPADM

## 2024-05-03 RX ORDER — DEXAMETHASONE SODIUM PHOSPHATE 4 MG/ML
INJECTION, SOLUTION INTRA-ARTICULAR; INTRALESIONAL; INTRAMUSCULAR; INTRAVENOUS; SOFT TISSUE PRN
Status: DISCONTINUED | OUTPATIENT
Start: 2024-05-03 | End: 2024-05-03

## 2024-05-03 RX ORDER — HYDROMORPHONE HCL IN WATER/PF 6 MG/30 ML
0.4 PATIENT CONTROLLED ANALGESIA SYRINGE INTRAVENOUS EVERY 5 MIN PRN
Status: DISCONTINUED | OUTPATIENT
Start: 2024-05-03 | End: 2024-05-03

## 2024-05-03 RX ORDER — HYDROXYZINE HYDROCHLORIDE 25 MG/1
25 TABLET, FILM COATED ORAL EVERY 6 HOURS PRN
Status: DISCONTINUED | OUTPATIENT
Start: 2024-05-03 | End: 2024-05-05 | Stop reason: HOSPADM

## 2024-05-03 RX ORDER — ONDANSETRON 2 MG/ML
4 INJECTION INTRAMUSCULAR; INTRAVENOUS EVERY 6 HOURS PRN
Status: DISCONTINUED | OUTPATIENT
Start: 2024-05-03 | End: 2024-05-05 | Stop reason: HOSPADM

## 2024-05-03 RX ORDER — ALBUTEROL SULFATE 0.83 MG/ML
2.5 SOLUTION RESPIRATORY (INHALATION) EVERY 4 HOURS PRN
Status: DISCONTINUED | OUTPATIENT
Start: 2024-05-03 | End: 2024-05-03 | Stop reason: HOSPADM

## 2024-05-03 RX ORDER — HYDROMORPHONE HCL IN WATER/PF 6 MG/30 ML
0.2 PATIENT CONTROLLED ANALGESIA SYRINGE INTRAVENOUS EVERY 5 MIN PRN
Status: DISCONTINUED | OUTPATIENT
Start: 2024-05-03 | End: 2024-05-03

## 2024-05-03 RX ORDER — HYDROXYZINE HYDROCHLORIDE 25 MG/1
25 TABLET, FILM COATED ORAL EVERY 6 HOURS PRN
Qty: 30 TABLET | Refills: 0 | Status: SHIPPED | OUTPATIENT
Start: 2024-05-03

## 2024-05-03 RX ORDER — FENTANYL CITRATE 50 UG/ML
INJECTION, SOLUTION INTRAMUSCULAR; INTRAVENOUS PRN
Status: DISCONTINUED | OUTPATIENT
Start: 2024-05-03 | End: 2024-05-03

## 2024-05-03 RX ORDER — ONDANSETRON 4 MG/1
4-8 TABLET, ORALLY DISINTEGRATING ORAL EVERY 8 HOURS PRN
Qty: 10 TABLET | Refills: 0 | Status: SHIPPED | OUTPATIENT
Start: 2024-05-03

## 2024-05-03 RX ORDER — LIDOCAINE 40 MG/G
CREAM TOPICAL
Status: DISCONTINUED | OUTPATIENT
Start: 2024-05-03 | End: 2024-05-03 | Stop reason: HOSPADM

## 2024-05-03 RX ORDER — ONDANSETRON 2 MG/ML
INJECTION INTRAMUSCULAR; INTRAVENOUS PRN
Status: DISCONTINUED | OUTPATIENT
Start: 2024-05-03 | End: 2024-05-03

## 2024-05-03 RX ORDER — AMOXICILLIN 250 MG
1-2 CAPSULE ORAL 2 TIMES DAILY
Qty: 30 TABLET | Refills: 0 | Status: SHIPPED | OUTPATIENT
Start: 2024-05-03

## 2024-05-03 RX ORDER — FENTANYL CITRATE 50 UG/ML
25 INJECTION, SOLUTION INTRAMUSCULAR; INTRAVENOUS EVERY 5 MIN PRN
Status: DISCONTINUED | OUTPATIENT
Start: 2024-05-03 | End: 2024-05-03 | Stop reason: HOSPADM

## 2024-05-03 RX ORDER — ACETAMINOPHEN 325 MG/1
650 TABLET ORAL EVERY 4 HOURS PRN
Qty: 100 TABLET | Refills: 0 | Status: SHIPPED | OUTPATIENT
Start: 2024-05-03

## 2024-05-03 RX ORDER — DEXAMETHASONE SODIUM PHOSPHATE 4 MG/ML
4 INJECTION, SOLUTION INTRA-ARTICULAR; INTRALESIONAL; INTRAMUSCULAR; INTRAVENOUS; SOFT TISSUE
Status: DISCONTINUED | OUTPATIENT
Start: 2024-05-03 | End: 2024-05-03 | Stop reason: HOSPADM

## 2024-05-03 RX ORDER — ACETAMINOPHEN 325 MG/1
650 TABLET ORAL EVERY 6 HOURS PRN
Status: DISCONTINUED | OUTPATIENT
Start: 2024-05-03 | End: 2024-05-05 | Stop reason: HOSPADM

## 2024-05-03 RX ORDER — CEFAZOLIN SODIUM/WATER 2 G/20 ML
2 SYRINGE (ML) INTRAVENOUS SEE ADMIN INSTRUCTIONS
Status: DISCONTINUED | OUTPATIENT
Start: 2024-05-03 | End: 2024-05-03 | Stop reason: HOSPADM

## 2024-05-03 RX ORDER — IBUPROFEN 600 MG/1
600 TABLET, FILM COATED ORAL EVERY 6 HOURS PRN
Qty: 30 TABLET | Refills: 0 | Status: SHIPPED | OUTPATIENT
Start: 2024-05-03

## 2024-05-03 RX ORDER — ONDANSETRON 4 MG/1
4 TABLET, ORALLY DISINTEGRATING ORAL EVERY 30 MIN PRN
Status: DISCONTINUED | OUTPATIENT
Start: 2024-05-03 | End: 2024-05-03 | Stop reason: HOSPADM

## 2024-05-03 RX ADMIN — Medication 2 G: at 11:29

## 2024-05-03 RX ADMIN — HYDROXYZINE HYDROCHLORIDE 25 MG: 25 TABLET, FILM COATED ORAL at 18:10

## 2024-05-03 RX ADMIN — ROCURONIUM BROMIDE 10 MG: 50 INJECTION, SOLUTION INTRAVENOUS at 13:15

## 2024-05-03 RX ADMIN — OXYCODONE HYDROCHLORIDE 10 MG: 10 TABLET ORAL at 22:24

## 2024-05-03 RX ADMIN — ROCURONIUM BROMIDE 5 MG: 50 INJECTION, SOLUTION INTRAVENOUS at 13:04

## 2024-05-03 RX ADMIN — ACETAMINOPHEN 975 MG: 325 TABLET, FILM COATED ORAL at 10:15

## 2024-05-03 RX ADMIN — HYDROMORPHONE HYDROCHLORIDE 1 MG: 1 INJECTION, SOLUTION INTRAMUSCULAR; INTRAVENOUS; SUBCUTANEOUS at 11:55

## 2024-05-03 RX ADMIN — FENTANYL CITRATE 25 MCG: 50 INJECTION INTRAMUSCULAR; INTRAVENOUS at 14:07

## 2024-05-03 RX ADMIN — ACETAMINOPHEN 650 MG: 325 TABLET, FILM COATED ORAL at 17:07

## 2024-05-03 RX ADMIN — SODIUM CHLORIDE, POTASSIUM CHLORIDE, SODIUM LACTATE AND CALCIUM CHLORIDE: 600; 310; 30; 20 INJECTION, SOLUTION INTRAVENOUS at 13:50

## 2024-05-03 RX ADMIN — KETOROLAC TROMETHAMINE 15 MG: 30 INJECTION, SOLUTION INTRAMUSCULAR at 14:01

## 2024-05-03 RX ADMIN — FENTANYL CITRATE 25 MCG: 50 INJECTION, SOLUTION INTRAMUSCULAR; INTRAVENOUS at 15:53

## 2024-05-03 RX ADMIN — PHENYLEPHRINE HYDROCHLORIDE 50 MCG: 10 INJECTION INTRAVENOUS at 13:38

## 2024-05-03 RX ADMIN — PHENAZOPYRIDINE 200 MG: 200 TABLET ORAL at 10:15

## 2024-05-03 RX ADMIN — SODIUM CHLORIDE, POTASSIUM CHLORIDE, SODIUM LACTATE AND CALCIUM CHLORIDE: 600; 310; 30; 20 INJECTION, SOLUTION INTRAVENOUS at 12:23

## 2024-05-03 RX ADMIN — SODIUM CHLORIDE, POTASSIUM CHLORIDE, SODIUM LACTATE AND CALCIUM CHLORIDE: 600; 310; 30; 20 INJECTION, SOLUTION INTRAVENOUS at 10:37

## 2024-05-03 RX ADMIN — ROCURONIUM BROMIDE 10 MG: 50 INJECTION, SOLUTION INTRAVENOUS at 13:37

## 2024-05-03 RX ADMIN — FAMOTIDINE 20 MG: 20 TABLET ORAL at 20:13

## 2024-05-03 RX ADMIN — ROCURONIUM BROMIDE 50 MG: 50 INJECTION, SOLUTION INTRAVENOUS at 11:38

## 2024-05-03 RX ADMIN — OXYCODONE HYDROCHLORIDE 10 MG: 10 TABLET ORAL at 17:06

## 2024-05-03 RX ADMIN — ONDANSETRON 4 MG: 2 INJECTION INTRAMUSCULAR; INTRAVENOUS at 13:59

## 2024-05-03 RX ADMIN — PHENYLEPHRINE HYDROCHLORIDE 50 MCG: 10 INJECTION INTRAVENOUS at 13:26

## 2024-05-03 RX ADMIN — PHENYLEPHRINE HYDROCHLORIDE 100 MCG: 10 INJECTION INTRAVENOUS at 13:42

## 2024-05-03 RX ADMIN — FENTANYL CITRATE 25 MCG: 50 INJECTION INTRAMUSCULAR; INTRAVENOUS at 14:16

## 2024-05-03 RX ADMIN — FENTANYL CITRATE 100 MCG: 50 INJECTION INTRAMUSCULAR; INTRAVENOUS at 11:38

## 2024-05-03 RX ADMIN — FENTANYL CITRATE 25 MCG: 50 INJECTION INTRAMUSCULAR; INTRAVENOUS at 14:08

## 2024-05-03 RX ADMIN — LIDOCAINE HYDROCHLORIDE 50 MG: 20 INJECTION, SOLUTION INFILTRATION; PERINEURAL at 11:38

## 2024-05-03 RX ADMIN — PHENYLEPHRINE HYDROCHLORIDE 100 MCG: 10 INJECTION INTRAVENOUS at 12:50

## 2024-05-03 RX ADMIN — PROPOFOL 60 MCG/KG/MIN: 10 INJECTION, EMULSION INTRAVENOUS at 11:42

## 2024-05-03 RX ADMIN — ROCURONIUM BROMIDE 15 MG: 50 INJECTION, SOLUTION INTRAVENOUS at 12:45

## 2024-05-03 RX ADMIN — IBUPROFEN 600 MG: 600 TABLET, FILM COATED ORAL at 20:13

## 2024-05-03 RX ADMIN — MIDAZOLAM 2 MG: 1 INJECTION INTRAMUSCULAR; INTRAVENOUS at 11:33

## 2024-05-03 RX ADMIN — FENTANYL CITRATE 50 MCG: 50 INJECTION, SOLUTION INTRAMUSCULAR; INTRAVENOUS at 14:58

## 2024-05-03 RX ADMIN — FENTANYL CITRATE 25 MCG: 50 INJECTION INTRAMUSCULAR; INTRAVENOUS at 14:14

## 2024-05-03 RX ADMIN — TRANEXAMIC ACID 1 G: 1 INJECTION, SOLUTION INTRAVENOUS at 12:50

## 2024-05-03 RX ADMIN — OXYCODONE HYDROCHLORIDE 5 MG: 10 TABLET ORAL at 18:10

## 2024-05-03 RX ADMIN — ROCURONIUM BROMIDE 20 MG: 50 INJECTION, SOLUTION INTRAVENOUS at 12:18

## 2024-05-03 RX ADMIN — PHENYLEPHRINE HYDROCHLORIDE 100 MCG: 10 INJECTION INTRAVENOUS at 12:48

## 2024-05-03 RX ADMIN — FENTANYL CITRATE 25 MCG: 50 INJECTION, SOLUTION INTRAMUSCULAR; INTRAVENOUS at 15:15

## 2024-05-03 RX ADMIN — DEXAMETHASONE SODIUM PHOSPHATE 4 MG: 4 INJECTION, SOLUTION INTRA-ARTICULAR; INTRALESIONAL; INTRAMUSCULAR; INTRAVENOUS; SOFT TISSUE at 11:38

## 2024-05-03 RX ADMIN — PHENYLEPHRINE HYDROCHLORIDE 100 MCG: 10 INJECTION INTRAVENOUS at 13:32

## 2024-05-03 RX ADMIN — PROPOFOL 200 MG: 10 INJECTION, EMULSION INTRAVENOUS at 11:38

## 2024-05-03 RX ADMIN — SUGAMMADEX 400 MG: 100 INJECTION, SOLUTION INTRAVENOUS at 14:04

## 2024-05-03 ASSESSMENT — ACTIVITIES OF DAILY LIVING (ADL)
ADLS_ACUITY_SCORE: 26
ADLS_ACUITY_SCORE: 24
ADLS_ACUITY_SCORE: 22
ADLS_ACUITY_SCORE: 24
ADLS_ACUITY_SCORE: 26
ADLS_ACUITY_SCORE: 22
ADLS_ACUITY_SCORE: 24
ADLS_ACUITY_SCORE: 20
ADLS_ACUITY_SCORE: 26
ADLS_ACUITY_SCORE: 24
ADLS_ACUITY_SCORE: 22
ADLS_ACUITY_SCORE: 26

## 2024-05-03 NOTE — ANESTHESIA POSTPROCEDURE EVALUATION
Patient: Toyin Andrew    Procedure: Procedure(s):  Total Abdominal Hysterectomy, Bilateral Salpingectomy       Anesthesia Type:  General    Note:  Disposition: Inpatient   Postop Pain Control:    PONV: No   Neuro/Psych: Uneventful            Sign Out: Acceptable/Baseline neuro status   Airway/Respiratory: Uneventful            Sign Out: Acceptable/Baseline resp. status   CV/Hemodynamics: Uneventful            Sign Out: Acceptable CV status; No obvious hypovolemia; No obvious fluid overload   Other NRE:    DID A NON-ROUTINE EVENT OCCUR? No           Last vitals:  Vitals Value Taken Time   /75 05/03/24 1623   Temp 97.8  F (36.6  C) 05/03/24 1623   Pulse 92 05/03/24 1624   Resp 18 05/03/24 1624   SpO2 94 % 05/03/24 1625   Vitals shown include unfiled device data.    Electronically Signed By: Helen Salinas MD  May 3, 2024  4:47 PM

## 2024-05-03 NOTE — ANESTHESIA PREPROCEDURE EVALUATION
Anesthesia Pre-Procedure Evaluation    Patient: Toyin Andrew   MRN: [id number] : 1975        Procedure : Procedure(s):  Total Abdominal Hysterectomy, Bilateral Salpingectomy, Possible Bilateral Oophorectomy          Past Medical History:   Diagnosis Date    Anemia     currently taking iron    History of colposcopy with cervical biopsy 2011    VONDA I    HSIL (high grade squamous intraepithelial lesion) on Pap smear 2011    Pap smear of cervix with ASCUS, cannot exclude HGSIL 2013    Uterine fibroid       Past Surgical History:   Procedure Laterality Date    BIOPSY OF BREAST, INCISIONAL      left breast biopsy - negative     SECTION  2013    Procedure:  SECTION;  primary  section ;  Surgeon: Linda Borja MD;  Location: [facility]    SURGICAL HISTORY OF -       uterine fibroids    SURGICAL HISTORY OF -       right foot lump removal      No Known Allergies   Social History     Tobacco Use    Smoking status: Never    Smokeless tobacco: Never   Substance Use Topics    Alcohol use: No     Comment: Not during pregnancy.      Wt Readings from Last 1 Encounters:   24 107.1 kg (236 lb 3.2 oz)        Anesthesia Evaluation            ROS/MED HX  ENT/Pulmonary:  - neg pulmonary ROS     Neurologic:       Cardiovascular:  - neg cardiovascular ROS     METS/Exercise Tolerance:     Hematologic:     (+)      anemia,          Musculoskeletal:       GI/Hepatic:       Renal/Genitourinary:       Endo:       Psychiatric/Substance Use:       Infectious Disease:       Malignancy:       Other:            Physical Exam    Airway        Mallampati: II   TM distance: > 3 FB   Neck ROM: full   Mouth opening: > 3 cm    Respiratory Devices and Support         Dental           Cardiovascular   cardiovascular exam normal          Pulmonary   pulmonary exam normal                OUTSIDE LABS:  CBC:   Lab Results   Component Value Date    WBC 3.9 (L) 2021    WBC 4.4 2020     HGB 11.9 01/28/2021    HGB 9.2 (L) 02/12/2020    HCT 37.9 01/28/2021    HCT 31.3 (L) 02/12/2020     01/28/2021     02/12/2020     BMP:   Lab Results   Component Value Date     01/28/2021     02/12/2020    POTASSIUM 3.9 01/28/2021    POTASSIUM 4.2 02/12/2020    CHLORIDE 108 01/28/2021    CHLORIDE 109 02/12/2020    CO2 29 01/28/2021    CO2 25 02/12/2020    BUN 5 (L) 01/28/2021    BUN 8 02/12/2020    CR 0.70 01/28/2021    CR 0.60 02/12/2020    GLC 95 01/28/2021     (H) 02/12/2020     COAGS:   Lab Results   Component Value Date    PTT 27 09/13/2009    INR 1.05 09/13/2009    FIBR 446 (H) 09/13/2009     POC:   Lab Results   Component Value Date    HCG Positive (A) 08/11/2009    HCGS Negative 06/21/2012     HEPATIC:   Lab Results   Component Value Date    ALBUMIN 3.5 01/28/2021    PROTTOTAL 7.6 01/28/2021    ALT 14 01/28/2021    AST 15 01/28/2021    ALKPHOS 75 01/28/2021    BILITOTAL 0.3 01/28/2021     OTHER:   Lab Results   Component Value Date    A1C 5.1 07/31/2019    GEORGETTE 9.6 01/28/2021    LIPASE 81 01/28/2021    TSH 1.44 02/12/2020       Anesthesia Plan    ASA Status:  2    NPO Status:  NPO Appropriate    Anesthesia Type: General.     - Airway: ETT   Induction: Intravenous.   Maintenance: Balanced.        Consents    Anesthesia Plan(s) and associated risks, benefits, and realistic alternatives discussed. Questions answered and patient/representative(s) expressed understanding.     - Discussed:     - Discussed with:  Patient            Postoperative Care    Pain management: IV analgesics, Oral pain medications, Multi-modal analgesia.   PONV prophylaxis: Ondansetron (or other 5HT-3), Dexamethasone or Solumedrol     Comments:               Mariaa Rahman MD    I have reviewed the pertinent notes and labs in the chart from the past 30 days and (re)examined the patient.  Any updates or changes from those notes are reflected in this note.

## 2024-05-03 NOTE — OP NOTE
PREOPERATIVE DIAGNOSIS: Fibroid uterus, pelvic pressure    POSTOPERATIVE DIAGNOSIS: Same.    PROCEDURE(S):   Total abdominal hysterectomy, bilateral salpingectomy.    SURGEON: Dr. Smith    ASSISTANT: Dr. Garza    ANESTHESIA: General    INDICATIONS: Toyin Andrew is a 48 year old female with long standing hx of large multi-fibroid uterus now causing her significant abdominal and pelvic pain option for definitive surgical management.    FINDINGS:     - enlarged uterus consistent with multiple fibroids as described on imaging.   - vertical incision from 2 cm above pubic symphysis to 2 cm above umbilicus was required given the size of the multiple pedunculated fibroids extending all the way up the the LUQ and a large fibroid of about 15 cm that was occupying the whole right upper and lower quadrants.   - the fibroids were all externalized helping to identify the distorted anatomy with both of her ovaries being pushed to the left side of her abdomen.   - myomectomy of the largest 20 cm fibroid on the right side was done without difficulties which then allowed to align the uterus and remaining fibroids in order to have appropriate anatomic landmarks which helped ultimately to perform the rest of the hysterectomy in the usual fashion.   - the left and right tube were dissected and resected, sent separately from the rest of the specimen  - due to the prominent vessels secondary to multiple enlarged fibroids, during dissection of the largest fibroid of about 15 cm significant bleeding was encountered which accounted for most of the bleeding.   - no other organomegaly, masses, endometriosis or adhesions were encountered.       PROCEDURE: After informed consent was obtained, Toyin was taken to the operating suite where she identified herself and the nature of her procedure. General anesthesia was induced. Exam under anesthesia undertaken. She was prepped and draped in the normal sterile fashion in supine position. DVT and  antibiotic prophylaxes were in place. A time out was performed.     The abdomen was opened with a vertical midline incision 2 cm above pubic symphysis and 2 cm above umbilicus had to be done in order to be able to externalized the largest fibroid which was obscuring appropriate anatomic landmarks and preventing from performing hysterectomy in a safe manner. Incision was taken down to the fascia which was opened laterally, dissected off the rectus muscles and the peritoneum entered bluntly. The peritoneal incision was extended cephalad to the umbilicus, no retractor was used. Initial examination showed the significant and enlarged fibroid on the right which was occupying the whole right side of her peritoneal  cavity, pushing bowels upwards and uterus tubes and ovaries all the way to the left side.     First, we externalized the right large fibroid that was of about 20 cm in length, a myomectomy was done in the usual fashion which caused most of the bleeding for this case. Ultimately this permitted appropriate alignment and visualization of the uterine anatomy at which point the rest of the hysterectomy was done without difficulties.     Hysterectomy was done with a Ligasure device. First the right round ligament and right utero-ovarian ligament was clamped and dissected off. Dissection down towards the thinned out anterior broad ligament due to stretching of anatomy caused by large fibroid was done. Appropriate right skeletonization was done and then right uterine artery and branches were clamped and dissected off without complications. A bladder flap was done without difficulties.  Hemostasis was ensured. Charmaine clamp and dissection was done until finding the limits of the external cervical anatomy. Same was done on the left side. Fallopian tubes were resected off in the usual fashion.       Next, the left fallopian tube was clamped along the mesosalpinx and divided from the ovary, suture ligating the base with  0-vicryl. The left round ligament was divided and the retroperitoneum explored, isolating the utero-ovarian ligament. A window was made in the posterior leaf of the broad ligament, and the uteroovarian ligament was clamped, cut and suture ligated. The posterior left of the broad ligament was dissected down, skeletonizing the uterine vessels.     Once the external os of the cervix was reached, the upper vagina was crossclamped and the uterine specimen amputated.     The apices were closed with figure of 8 sutures of 0-Vicryl suspended to the uterosacral ligaments. The central portion of the cuff was closed with interrupted 0-Vicryl. The adnexal pedicles were inspected and noted to be hemostatic. The ureters were located and noted to be distant from sites of surgery. Hemostasis in the pelvis was confirmed. Surgicel powder was applied to all pedicles and colpotomy closure. Then the closure began. The peritoneum, muscle and fascia were closed with a 0-PDS starting caudal. Then the adipose layer was re approximated with a running non locking stitch with 2-=0 plain suture. The skin with a subdermal using 4-0 Monocryl. Surgical glue was used.     Toyin tolerated the procedure well and was taken to recovery in stable condition.     Final counts correct.  Pre and post procedural time-outs were completed per protocol.      ESTIMATED BLOOD LOSS: 650 cc.    SPECIMENS: Uterus and cervix, tubes.      Dr. Smith

## 2024-05-03 NOTE — OR NURSING
"Multiple attempts made to send automated text message \"patient doing well \"per surgeon via EPIC, message unable to be sent. Verified telephone number with number written on front of chart-check in paper. Phone numbers matched.   "

## 2024-05-03 NOTE — DISCHARGE SUMMARY
[facility] Discharge Summary    Toyin Andrew MRN# [id number]   Age: 48 year old YOB: 1975     Date of Admission:  5/3/2024  Date of Discharge::  5/5/2024   Admitting Physician:  5/5/2024  Discharge Physician:       Home clinic: [facility]          Admission Diagnoses:   Pelvic pain,  Pelvic pressure  Abdominal pain  Multiple fibroid uterus  VISHNU          Discharge Diagnosis:     Same, plus  Superimposed ABLA, mild, asymptomatic          Procedures:     Procedure(s):  KARTIK BS       none           Medications Prior to Admission:     Medications Prior to Admission   Medication Sig Dispense Refill Last Dose    ferrous gluconate (FERGON) 324 (38 Fe) MG tablet Take 1 tablet (324 mg) by mouth daily (with breakfast)  0 5/1/2024    magnesium 250 MG tablet Take 1 tablet by mouth daily   5/2/2024             Discharge Medications:     Current Discharge Medication List        START taking these medications    Details   acetaminophen (TYLENOL) 325 MG tablet Take 2 tablets (650 mg) by mouth every 4 hours as needed for other (mild pain)  Qty: 100 tablet, Refills: 0    Associated Diagnoses: S/P abdominal hysterectomy      hydrOXYzine HCl (ATARAX) 25 MG tablet Take 1 tablet (25 mg) by mouth every 6 hours as needed for itching or anxiety (with pain, moderate pain)  Qty: 30 tablet, Refills: 0    Associated Diagnoses: S/P abdominal hysterectomy      ibuprofen (ADVIL/MOTRIN) 600 MG tablet Take 1 tablet (600 mg) by mouth every 6 hours as needed for mild pain  Qty: 30 tablet, Refills: 0    Associated Diagnoses: S/P abdominal hysterectomy      ondansetron (ZOFRAN ODT) 4 MG ODT tab Take 1-2 tablets (4-8 mg) by mouth every 8 hours as needed for nausea Dissolve ON the tongue.  Qty: 10 tablet, Refills: 0    Associated Diagnoses: S/P abdominal hysterectomy      oxyCODONE (ROXICODONE) 5 MG tablet Take 1-2 tablets (5-10 mg) by mouth every 3 hours as needed for pain (Moderate to Severe)  Qty: 10 tablet, Refills: 0    Associated  Diagnoses: S/P abdominal hysterectomy      senna-docusate (SENOKOT-S/PERICOLACE) 8.6-50 MG tablet Take 1-2 tablets by mouth 2 times daily Take while on oral narcotics to prevent or treat constipation.  Qty: 30 tablet, Refills: 0    Comments: While taking narcotics  Associated Diagnoses: S/P abdominal hysterectomy           CONTINUE these medications which have NOT CHANGED    Details   ferrous gluconate (FERGON) 324 (38 Fe) MG tablet Take 1 tablet (324 mg) by mouth daily (with breakfast)  Refills: 0    Associated Diagnoses: Microcytic anemia      magnesium 250 MG tablet Take 1 tablet by mouth daily                   Consultations:   No consultations were requested during this admission          Brief History of Illness:   Reason for admission requiring a surgical or invasive procedure:    Pt is a 49 yo female with long standing history of multiple fibroids that in the last few years have become larger to the point that the patient is unable to bend over without discomfort. She is reporting abdominal pain all over but predominantly on her right side (upper and lower quadrants). She had had an UAE back in 2022. She also has hx of an abdominal myomectomy in the past. Her menses are regular and flow is moderate for he most part.            Hospital Course:   The patient's hospital course was unremarkable.  She recovered as anticipated and experienced no post-operative complications.           Discharge Instructions and Follow-Up:     Discharge diet: Regular   Discharge activity: No driving or operating machinery while on narcotic analgesics  Pelvic rest: abstain from intercourse and do not use tampons for 6 week(s)   Discharge follow-up: Keep scheduled appointment   Wound care Drink plenty of fluids  Ice to area for comfort  Keep wound clean and dry           Discharge Disposition:     Discharged to home      Rianna Knight MD on 5/5/2024 at 7:58 AM

## 2024-05-03 NOTE — OR NURSING
Paper script for home sent to [facility] to be filled.    Vianney Velazquez RN on 5/3/2024 at 4:40 PM

## 2024-05-03 NOTE — ANESTHESIA CARE TRANSFER NOTE
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    Anesthesia Type:   General     Note:    Oropharynx: oropharynx clear of all foreign objects and spontaneously breathing  Level of Consciousness: drowsy  Oxygen Supplementation: face mask  Level of Supplemental Oxygen (L/min / FiO2): 8  Independent Airway: airway patency satisfactory and stable  Dentition: dentition unchanged  Vital Signs Stable: post-procedure vital signs reviewed and stable  Report to RN Given: handoff report given  Patient transferred to: PACU    Handoff Report: Identifed the Patient, Identified the Reponsible Provider, Reviewed the pertinent medical history, Discussed the surgical course, Reviewed Intra-OP anesthesia mangement and issues during anesthesia, Set expectations for post-procedure period and Allowed opportunity for questions and acknowledgement of understanding  Vitals:  Vitals Value Taken Time   /82 05/03/24 1435   Temp     Pulse 99 05/03/24 1438   Resp 19 05/03/24 1438   SpO2 100 % 05/03/24 1438   Vitals shown include unfiled device data.    Electronically Signed By: LISA Cox CRNA  May 3, 2024  2:39 PM

## 2024-05-03 NOTE — ANESTHESIA PROCEDURE NOTES
Airway       Patient location during procedure: OR       Procedure Start/Stop Times: 5/3/2024 11:42 AM  Staff -        CRNA: Darryl Duque APRN CRNA       Performed By: CRNA  Consent for Airway        Urgency: elective  Indications and Patient Condition       Indications for airway management: tree-procedural       Induction type:intravenous       Mask difficulty assessment: 1 - vent by mask    Final Airway Details       Final airway type: endotracheal airway       Successful airway: ETT - single and Oral  Endotracheal Airway Details        ETT size (mm): 7.0       Cuffed: yes       Cuff volume (mL): 5       Successful intubation technique: direct laryngoscopy       DL Blade Type: Díaz 2       Grade View of Cords: 1       Adjucts: stylet       Position: Right       Measured from: gums/teeth       Secured at (cm): 20       Bite block used: Soft    Post intubation assessment        Placement verified by: capnometry, equal breath sounds and chest rise        Number of attempts at approach: 1       Number of other approaches attempted: 0       Secured with: tape       Ease of procedure: easy       Dentition: Intact and Unchanged    Medication(s) Administered   Medication Administration Time: 5/3/2024 11:42 AM

## 2024-05-03 NOTE — BRIEF OP NOTE
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]   Post-operative diagnosis Pelvic pain, abdominal pain, pelvic pressure, large multi-fibroid uterus   Procedure: Procedure(s):  Total Abdominal Hysterectomy, Bilateral Salpingectomy   Surgeon(s): Surgeons and Role:     * Akua Richards MD - Primary     * Mannie Garza MD - Assisting   Estimated blood loss: 650 mL    Specimens: ID Type Source Tests Collected by Time Destination   1 : uterus, cervix, bilateral fallopian tubes Tissue Uterus, Bilateral Fallopian Tubes SURGICAL PATHOLOGY EXAM Akua Richards MD 5/3/2024  1:43 PM    2 : uterine fibroid Tissue Uterus SURGICAL PATHOLOGY EXAM Akua Richards MD 5/3/2024  1:51 PM    A : Vein blood for type and screen Blood Vein ABO/RH TYPE AND SCREEN Akua Richards MD 5/3/2024 12:00 PM       Findings: - enlarged uterus consistent with multiple fibroids as described on imaging.   - vertical incision from 2 cm above pubic symphysis to 2 cm above umbilicus was required given the size of the multiple pedunculated fibroids extending all the way up the the LUQ and a large fibroid of about 20 cm that was occupying the whole right upper and lower quadrants.   - the fibroids were all externalized helping to identify the distorted anatomy with both of her ovaries being pushed to the left side of her abdomen.   - myomectomy of the largest 15 cm fibroid on the right side was done without difficulties which then allowed to align the uterus and remaining fibroids in order to have appropriate anatomic landmarks which helped ultimately to perform the rest of the hysterectomy in the usual fashion.   - the left and right tube were dissected and resected, sent separately from the rest of the specimen  - due to the enlarged vessel and during dissection of the largest fibroid fo about 15 cm significant bleeding was encountered which  accounted for most of the bleeding.   - no other organomegaly, masses, endometriosis or adhesions were encountered.     Dr. Sarah Campos  [phone]

## 2024-05-04 LAB
ERYTHROCYTE [DISTWIDTH] IN BLOOD BY AUTOMATED COUNT: 19.3 % (ref 10–15)
GLUCOSE BLDC GLUCOMTR-MCNC: 107 MG/DL (ref 70–99)
HCT VFR BLD AUTO: 30.5 % (ref 35–47)
HGB BLD-MCNC: 9.8 G/DL (ref 11.7–15.7)
MCH RBC QN AUTO: 27.8 PG (ref 26.5–33)
MCHC RBC AUTO-ENTMCNC: 32.1 G/DL (ref 31.5–36.5)
MCV RBC AUTO: 87 FL (ref 78–100)
PLATELET # BLD AUTO: 189 10E3/UL (ref 150–450)
RBC # BLD AUTO: 3.52 10E6/UL (ref 3.8–5.2)
WBC # BLD AUTO: 6.3 10E3/UL (ref 4–11)

## 2024-05-04 PROCEDURE — 250N000013 HC RX MED GY IP 250 OP 250 PS 637: Performed by: OBSTETRICS & GYNECOLOGY

## 2024-05-04 PROCEDURE — 85027 COMPLETE CBC AUTOMATED: CPT | Performed by: OBSTETRICS & GYNECOLOGY

## 2024-05-04 PROCEDURE — 82962 GLUCOSE BLOOD TEST: CPT

## 2024-05-04 PROCEDURE — 36415 COLL VENOUS BLD VENIPUNCTURE: CPT | Performed by: OBSTETRICS & GYNECOLOGY

## 2024-05-04 PROCEDURE — 120N000001 HC R&B MED SURG/OB

## 2024-05-04 RX ORDER — MAGNESIUM OXIDE 400 MG/1
400 TABLET ORAL DAILY
Status: DISCONTINUED | OUTPATIENT
Start: 2024-05-04 | End: 2024-05-05 | Stop reason: HOSPADM

## 2024-05-04 RX ORDER — FERROUS GLUCONATE 324(38)MG
324 TABLET ORAL
Status: DISCONTINUED | OUTPATIENT
Start: 2024-05-04 | End: 2024-05-05 | Stop reason: HOSPADM

## 2024-05-04 RX ADMIN — OXYCODONE HYDROCHLORIDE 5 MG: 10 TABLET ORAL at 08:00

## 2024-05-04 RX ADMIN — FAMOTIDINE 20 MG: 20 TABLET ORAL at 20:37

## 2024-05-04 RX ADMIN — ACETAMINOPHEN 650 MG: 325 TABLET, FILM COATED ORAL at 16:39

## 2024-05-04 RX ADMIN — OXYCODONE HYDROCHLORIDE 15 MG: 10 TABLET ORAL at 16:38

## 2024-05-04 RX ADMIN — ACETAMINOPHEN 650 MG: 325 TABLET, FILM COATED ORAL at 08:01

## 2024-05-04 RX ADMIN — MAGNESIUM OXIDE TAB 400 MG (241.3 MG ELEMENTAL MG) 400 MG: 400 (241.3 MG) TAB at 12:21

## 2024-05-04 RX ADMIN — OXYCODONE HYDROCHLORIDE 10 MG: 10 TABLET ORAL at 06:35

## 2024-05-04 RX ADMIN — OXYCODONE HYDROCHLORIDE 10 MG: 10 TABLET ORAL at 20:37

## 2024-05-04 RX ADMIN — OXYCODONE HYDROCHLORIDE 15 MG: 10 TABLET ORAL at 12:20

## 2024-05-04 RX ADMIN — FAMOTIDINE 20 MG: 20 TABLET ORAL at 08:01

## 2024-05-04 RX ADMIN — HYDROXYZINE HYDROCHLORIDE 25 MG: 25 TABLET, FILM COATED ORAL at 08:01

## 2024-05-04 RX ADMIN — FERROUS GLUCONATE 324 MG: 324 TABLET ORAL at 12:21

## 2024-05-04 ASSESSMENT — ACTIVITIES OF DAILY LIVING (ADL)
ADLS_ACUITY_SCORE: 28
ADLS_ACUITY_SCORE: 28
ADLS_ACUITY_SCORE: 26
ADLS_ACUITY_SCORE: 25
ADLS_ACUITY_SCORE: 28
ADLS_ACUITY_SCORE: 26
ADLS_ACUITY_SCORE: 25
ADLS_ACUITY_SCORE: 28
ADLS_ACUITY_SCORE: 25
ADLS_ACUITY_SCORE: 28
ADLS_ACUITY_SCORE: 28
ADLS_ACUITY_SCORE: 26
ADLS_ACUITY_SCORE: 28
ADLS_ACUITY_SCORE: 26
ADLS_ACUITY_SCORE: 28
ADLS_ACUITY_SCORE: 28
ADLS_ACUITY_SCORE: 26
ADLS_ACUITY_SCORE: 25

## 2024-05-04 NOTE — PLAN OF CARE
Goal Outcome Evaluation:       Pt arrived from PACU around 1645. A&O. VSS Capno on, on RA. Abd incision covered, ice pack applied. Abd pain, giving PRN Oxycodone, Atarax, Tylenol. Gaudencio reg diet, denies nausea. Lauren in place.

## 2024-05-04 NOTE — PLAN OF CARE
"Goal Outcome Evaluation:      Plan of Care Reviewed With: patient    Overall Patient Progress: improvingOverall Patient Progress: improving    Outcome Evaluation: Pain management, Pt is still c/o of  lower abdominal pain, pain managed with PRN Oxcodone  To Do:  End of Shift Summary  For vital signs and complete assessments, please see documentation flowsheets.     Pertinent assessments: Assumed cares [id number], VSS on Capno. Clear lungs and HS. Hypoactive BS. Denies nausea and SOB. C/O pain PRN oxycodone and ibuphen given x 1. Lauren in place with good urine out. Tolerated regular diet. Midline incision intact, PIV saline locked.    Major Shift Events : Pain management, Pt ambulated out of bed, walked up to the BR door and sat at the edge of the bed.     Treatment Plan: Monitor labs, Pain and symptom management.     Bedside Nurse: Jaswinder Khanna RN   Problem: Adult Inpatient Plan of Care  Goal: Plan of Care Review  Description: The Plan of Care Review/Shift note should be completed every shift.  The Outcome Evaluation is a brief statement about your assessment that the patient is improving, declining, or no change.  This information will be displayed automatically on your shift  note.  Outcome: Progressing  Flowsheets (Taken 5/4/2024 0723)  Outcome Evaluation: Pain management, Pt is still c/o of  lower abdominal pain, pain managed with PRN Oxcodone  Plan of Care Reviewed With: patient  Overall Patient Progress: improving  Goal: Patient-Specific Goal (Individualized)  Description: You can add care plan individualizations to a care plan. Examples of Individualization might be:  \"Parent requests to be called daily at 9am for status\", \"I have a hard time hearing out of my right ear\", or \"Do not touch me to wake me up as it startles  me\".  Outcome: Progressing  Goal: Absence of Hospital-Acquired Illness or Injury  Outcome: Progressing  Intervention: Identify and Manage Fall Risk  Recent Flowsheet Documentation  Taken " 5/3/2024 2013 by Jaswinder Khanna RN  Safety Promotion/Fall Prevention:   activity supervised   assistive device/personal items within reach  Intervention: Prevent Skin Injury  Recent Flowsheet Documentation  Taken 5/3/2024 2013 by Jaswinder Khanna RN  Body Position: position changed independently  Intervention: Prevent and Manage VTE (Venous Thromboembolism) Risk  Recent Flowsheet Documentation  Taken 5/3/2024 2013 by Jaswinder Khanna RN  VTE Prevention/Management: SCDs (sequential compression devices) off  Goal: Optimal Comfort and Wellbeing  Outcome: Progressing  Intervention: Monitor Pain and Promote Comfort  Recent Flowsheet Documentation  Taken 5/3/2024 2309 by Jaswinder Khanna RN  Pain Management Interventions: medication (see MAR)  Taken 5/3/2024 2013 by Jaswinder Khanna RN  Pain Management Interventions:   medication (see MAR)   cold applied  Goal: Readiness for Transition of Care  Outcome: Progressing

## 2024-05-04 NOTE — PLAN OF CARE
"Pt up SBA. VSS on RA. A&O. Midline abd incision, taking PRN Oxycodone, Atarax & Tylenol, ice applied. Kristi regular diet, denies nausea. Calero d/c'd, Voided. BS hypo, no flatus. Amb around room & in halls, sat up in chair.   Goal Outcome Evaluation:      Plan of Care Reviewed With: patient    Overall Patient Progress: improvingOverall Patient Progress: improving    Outcome Evaluation: Pain management, kristi reg diet, voided post calero removal, amb    Problem: Adult Inpatient Plan of Care  Goal: Plan of Care Review  Description: The Plan of Care Review/Shift note should be completed every shift.  The Outcome Evaluation is a brief statement about your assessment that the patient is improving, declining, or no change.  This information will be displayed automatically on your shift  note.  Outcome: Progressing  Flowsheets (Taken 5/4/2024 1709)  Outcome Evaluation: Pain management, kristi reg diet, voided post calero removal, amb  Plan of Care Reviewed With: patient  Overall Patient Progress: improving  Goal: Patient-Specific Goal (Individualized)  Description: You can add care plan individualizations to a care plan. Examples of Individualization might be:  \"Parent requests to be called daily at 9am for status\", \"I have a hard time hearing out of my right ear\", or \"Do not touch me to wake me up as it startles  me\".  Outcome: Progressing  Goal: Absence of Hospital-Acquired Illness or Injury  Outcome: Progressing  Intervention: Identify and Manage Fall Risk  Recent Flowsheet Documentation  Taken 5/4/2024 0800 by Mariaa Overton RN  Safety Promotion/Fall Prevention:   activity supervised   nonskid shoes/slippers when out of bed  Intervention: Prevent Skin Injury  Recent Flowsheet Documentation  Taken 5/4/2024 0800 by Mariaa Overton RN  Body Position: position changed independently  Intervention: Prevent and Manage VTE (Venous Thromboembolism) Risk  Recent Flowsheet Documentation  Taken 5/4/2024 0800 by Mariaa Overton RN  VTE " Prevention/Management: SCDs (sequential compression devices) off  Goal: Optimal Comfort and Wellbeing  Outcome: Progressing  Intervention: Monitor Pain and Promote Comfort  Recent Flowsheet Documentation  Taken 5/4/2024 0800 by Mariaa Overton RN  Pain Management Interventions: medication (see MAR)  Goal: Readiness for Transition of Care  Outcome: Progressing     Problem: Pain Acute  Goal: Optimal Pain Control and Function  Outcome: Progressing  Intervention: Develop Pain Management Plan  Recent Flowsheet Documentation  Taken 5/4/2024 0800 by Mariaa Overton RN  Pain Management Interventions: medication (see MAR)  Intervention: Prevent or Manage Pain  Recent Flowsheet Documentation  Taken 5/4/2024 0800 by Mariaa Overton RN  Medication Review/Management: medications reviewed     Problem: Anemia  Goal: Anemia Symptom Improvement  Outcome: Progressing  Intervention: Monitor and Manage Anemia  Recent Flowsheet Documentation  Taken 5/4/2024 0800 by Mariaa Overton RN  Safety Promotion/Fall Prevention:   activity supervised   nonskid shoes/slippers when out of bed

## 2024-05-04 NOTE — PROGRESS NOTES
[facility] GYN Post-Op / Progress Note    Interval History   Doing well.  Pain is well-controlled.  No fevers.  No history of wound drainage, warmth or significant erythema.  Good appetite.  Denies chest pain, shortness of breath, nausea or vomiting.  Ambulatory.     Medications   Current Facility-Administered Medications   Medication Dose Route Frequency Provider Last Rate Last Admin     Current Facility-Administered Medications   Medication Dose Route Frequency Provider Last Rate Last Admin    famotidine (PEPCID) tablet 20 mg  20 mg Oral BID Akua Richards MD   20 mg at 05/04/24 0801    Or    famotidine (PEPCID) injection 20 mg  20 mg Intravenous BID Akua Richards MD        sodium chloride (PF) 0.9% PF flush 3 mL  3 mL Intracatheter Q8H Akua Richards MD   3 mL at 05/04/24 0212       Physical Exam   Temp: 98.4  F (36.9  C) Temp src: Oral BP: 106/62 Pulse: 84   Resp: 16 SpO2: 97 % O2 Device: None (Room air) Oxygen Delivery: 10 LPM  Vitals:    05/03/24 0921   Weight: 107.1 kg (236 lb 3.2 oz)     Vital Signs with Ranges  Temp:  [97.8  F (36.6  C)-99.1  F (37.3  C)] 98.4  F (36.9  C)  Pulse:  [] 84  Resp:  [15-20] 16  BP: (106-144)/(62-88) 106/62  SpO2:  [93 %-100 %] 97 %  I/O last 3 completed shifts:  In: 2570 [P.O.:370; I.V.:2200]  Out: 3800 [Urine:3150; Blood:650]    Incision C/D/I  Heart is regular rate and rhythm and lungs clear to auscultation    Data   Recent Labs   Lab Test 05/03/24  1200   AS Negative     Recent Labs   Lab Test 05/04/24  0735 05/03/24  1125   HGB 9.8* 10.1*     No lab results found.    Assessment & Plan   -1 Day Post-Op Procedure(s):  Total Abdominal Hysterectomy, Bilateral Salpingectomy    Clean wound without signs of infection.  Normal healing wound.  No excessive bleeding  Pain well-controlled.  Discharge later today or tomorrow    VISHNU with ABLA  -admission hb 10.1 > 9.8  -asymptomatic, home on iron OTC    Rianna Knight MD

## 2024-05-05 VITALS
HEART RATE: 92 BPM | WEIGHT: 236.2 LBS | RESPIRATION RATE: 16 BRPM | SYSTOLIC BLOOD PRESSURE: 119 MMHG | TEMPERATURE: 98.6 F | OXYGEN SATURATION: 98 % | DIASTOLIC BLOOD PRESSURE: 62 MMHG | BODY MASS INDEX: 35.91 KG/M2

## 2024-05-05 PROCEDURE — 250N000013 HC RX MED GY IP 250 OP 250 PS 637: Performed by: OBSTETRICS & GYNECOLOGY

## 2024-05-05 RX ORDER — DOCUSATE SODIUM 100 MG/1
100 CAPSULE, LIQUID FILLED ORAL 2 TIMES DAILY
Qty: 40 CAPSULE | Refills: 0 | Status: SHIPPED | OUTPATIENT
Start: 2024-05-05

## 2024-05-05 RX ADMIN — FERROUS GLUCONATE 324 MG: 324 TABLET ORAL at 08:58

## 2024-05-05 RX ADMIN — OXYCODONE HYDROCHLORIDE 15 MG: 10 TABLET ORAL at 08:58

## 2024-05-05 RX ADMIN — MAGNESIUM OXIDE TAB 400 MG (241.3 MG ELEMENTAL MG) 400 MG: 400 (241.3 MG) TAB at 08:57

## 2024-05-05 RX ADMIN — OXYCODONE HYDROCHLORIDE 10 MG: 10 TABLET ORAL at 02:05

## 2024-05-05 RX ADMIN — ACETAMINOPHEN 650 MG: 325 TABLET, FILM COATED ORAL at 08:57

## 2024-05-05 RX ADMIN — FAMOTIDINE 20 MG: 20 TABLET ORAL at 08:58

## 2024-05-05 ASSESSMENT — ACTIVITIES OF DAILY LIVING (ADL)
ADLS_ACUITY_SCORE: 26
ADLS_ACUITY_SCORE: 25
ADLS_ACUITY_SCORE: 26
ADLS_ACUITY_SCORE: 25
ADLS_ACUITY_SCORE: 26

## 2024-05-05 NOTE — PLAN OF CARE
"Goal Outcome Evaluation:      Plan of Care Reviewed With: patient    Overall Patient Progress: improvingOverall Patient Progress: improving    Outcome Evaluation: Pain well manage with PRN Oxycodone, no report of nausea and SOB, voiding and ambulates independently in the room.  To Do:  End of Shift Summary  For vital signs and complete assessments, please see documentation flowsheets.     Pertinent assessments:  Assumed cares [id number]. Pt up SBA. VSS on RA. A&O. Midline abd incision, taking PRN Oxycodone,Gaudencio regular diet, denies nausea.  BS hypo, no flatus. Ambulating in the room independently.  Major Shift Events : None    Treatment Plan: Monitor labs, Pain and symptom management.     Bedside Nurse: Jaswindre Khanna RN   Problem: Adult Inpatient Plan of Care  Goal: Plan of Care Review  Description: The Plan of Care Review/Shift note should be completed every shift.  The Outcome Evaluation is a brief statement about your assessment that the patient is improving, declining, or no change.  This information will be displayed automatically on your shift  note.  Outcome: Progressing  Flowsheets (Taken 5/4/2024 2308)  Outcome Evaluation: Pain well manage with PRN Oxycodone, no report of nausea and SOB, voiding and ambulates independently in the room.  Plan of Care Reviewed With: patient  Overall Patient Progress: improving  Goal: Patient-Specific Goal (Individualized)  Description: You can add care plan individualizations to a care plan. Examples of Individualization might be:  \"Parent requests to be called daily at 9am for status\", \"I have a hard time hearing out of my right ear\", or \"Do not touch me to wake me up as it startles  me\".  Outcome: Progressing  Goal: Absence of Hospital-Acquired Illness or Injury  Outcome: Progressing  Intervention: Identify and Manage Fall Risk  Recent Flowsheet Documentation  Taken 5/4/2024 2100 by Jaswinder Khanna, RN  Safety Promotion/Fall Prevention:   activity supervised   assistive " device/personal items within reach  Intervention: Prevent Skin Injury  Recent Flowsheet Documentation  Taken 5/4/2024 2011 by Jaswinder Khanna RN  Body Position: position changed independently  Intervention: Prevent and Manage VTE (Venous Thromboembolism) Risk  Recent Flowsheet Documentation  Taken 5/4/2024 2100 by Jaswinder Khanna RN  VTE Prevention/Management: SCDs (sequential compression devices) off  Goal: Optimal Comfort and Wellbeing  Outcome: Progressing  Intervention: Monitor Pain and Promote Comfort  Recent Flowsheet Documentation  Taken 5/4/2024 2122 by Jaswinder Khanna RN  Pain Management Interventions: medication (see MAR)  Taken 5/4/2024 2011 by Jaswinder Khnana RN  Pain Management Interventions: medication (see MAR)  Goal: Readiness for Transition of Care  Outcome: Progressing     Problem: Pain Acute  Goal: Optimal Pain Control and Function  Outcome: Progressing  Intervention: Develop Pain Management Plan  Recent Flowsheet Documentation  Taken 5/4/2024 2122 by Jaswinder Khanna RN  Pain Management Interventions: medication (see MAR)  Taken 5/4/2024 2011 by Jaswinder Khanna RN  Pain Management Interventions: medication (see MAR)  Intervention: Prevent or Manage Pain  Recent Flowsheet Documentation  Taken 5/4/2024 2100 by Jaswinder Khanna RN  Medication Review/Management: medications reviewed     Problem: Anemia  Goal: Anemia Symptom Improvement  Outcome: Progressing  Intervention: Monitor and Manage Anemia  Recent Flowsheet Documentation  Taken 5/4/2024 2100 by Jaswinder Khanna RN  Safety Promotion/Fall Prevention:   activity supervised   assistive device/personal items within reach

## 2024-05-05 NOTE — PROGRESS NOTES
[facility] Obstetrics Post-Op / Progress Note    Interval History   Doing well.  Pain is well-controlled.  No fevers.  No history of wound drainage, warmth or significant erythema.  Good appetite.  Denies chest pain, shortness of breath, nausea or vomiting.  Ambulatory.      Medications   Current Facility-Administered Medications   Medication Dose Route Frequency Provider Last Rate Last Admin     Current Facility-Administered Medications   Medication Dose Route Frequency Provider Last Rate Last Admin    famotidine (PEPCID) tablet 20 mg  20 mg Oral BID Akua Richards MD   20 mg at 05/04/24 2037    Or    famotidine (PEPCID) injection 20 mg  20 mg Intravenous BID Akua Richards MD        ferrous gluconate (FERGON) tablet 324 mg  324 mg Oral Daily with breakfast Rianna Knight MD   324 mg at 05/04/24 1221    magnesium oxide (MAG-OX) tablet 400 mg  400 mg Oral Daily Rianna Knight MD   400 mg at 05/04/24 1221    sodium chloride (PF) 0.9% PF flush 3 mL  3 mL Intracatheter Q8H Akua Richards MD   3 mL at 05/04/24 2334       Physical Exam   Temp: 98.5  F (36.9  C) Temp src: Oral BP: 101/65 Pulse: 89   Resp: 18 SpO2: 96 % O2 Device: None (Room air)    Vitals:    05/03/24 0921   Weight: 107.1 kg (236 lb 3.2 oz)     Vital Signs with Ranges  Temp:  [98.3  F (36.8  C)-98.5  F (36.9  C)] 98.5  F (36.9  C)  Pulse:  [84-90] 89  Resp:  [16-18] 18  BP: (101-127)/(62-68) 101/65  SpO2:  [96 %-97 %] 96 %  I/O last 3 completed shifts:  In: 970 [P.O.:970]  Out: 1250 [Urine:1250]    Incision C/D/I  Heart is regular rate and rhythm and lungs clear to auscultation    Data   Recent Labs   Lab Test 05/03/24  1200   AS Negative     Recent Labs   Lab Test 05/04/24  0735 05/03/24  1125   HGB 9.8* 10.1*     No lab results found.    Assessment & Plan   -2 Days Post-Op Procedure(s):  Total Abdominal Hysterectomy, Bilateral Salpingectomy    Clean wound without signs of infection.  Normal  healing wound.  No excessive bleeding  Pain well-controlled.  Discharge later today    VISHNU with ABLA  -admission hb 10.1 > 9.8  -asymptomatic, home on iron OTC    Fibroid uterus  -up to 15cm  -s/p KARTIK/BS    Rianna Knight MD

## 2024-05-07 LAB
PATH REPORT.COMMENTS IMP SPEC: NORMAL
PATH REPORT.COMMENTS IMP SPEC: NORMAL
PATH REPORT.FINAL DX SPEC: NORMAL
PATH REPORT.GROSS SPEC: NORMAL
PATH REPORT.MICROSCOPIC SPEC OTHER STN: NORMAL
PATH REPORT.RELEVANT HX SPEC: NORMAL
PHOTO IMAGE: NORMAL

## 2024-06-29 ENCOUNTER — HEALTH MAINTENANCE LETTER (OUTPATIENT)
Age: 49
End: 2024-06-29

## 2025-07-12 ENCOUNTER — HEALTH MAINTENANCE LETTER (OUTPATIENT)
Age: 50
End: 2025-07-12

## (undated) DEVICE — SU DERMABOND ADVANCED .7ML DNX12

## (undated) DEVICE — PAD CHUX UNDERPAD 30X36" P3036C

## (undated) DEVICE — SYR 10ML LL W/O NDL 302995

## (undated) DEVICE — LINEN ORTHO ACL PACK 5447

## (undated) DEVICE — NDL BLUNT 17GA 1.5" 8881202330

## (undated) DEVICE — ESU GROUND PAD ADULT W/CORD E7507

## (undated) DEVICE — TRAY PREP DRY SKIN SCRUB 067

## (undated) DEVICE — NEEDLE SPINAL DISP 22GA X 5" QUINCKE 3333355

## (undated) DEVICE — SU VICRYL 0 CT-1 27" J340H

## (undated) DEVICE — SYR 10ML FINGER CONTROL W/O NDL 309695

## (undated) DEVICE — DRSG TEGADERM 4X10" 1627

## (undated) DEVICE — SU PDS II 0 CTX 60" Z990G

## (undated) DEVICE — SU MONOCRYL 4-0 PS-2 18" UND Y496G

## (undated) DEVICE — SURGICEL POWDER ABSORBABLE HEMOSTAT 3GM 3013SP

## (undated) DEVICE — SU VICRYL 0 CT-1 CR 8X18" J740D

## (undated) DEVICE — SOL NACL 0.9% INJ 1000ML BAG 2B1324X

## (undated) DEVICE — LINEN FULL SHEET 5511

## (undated) DEVICE — DECANTER BAG 2002S

## (undated) DEVICE — PACK ABD HYST LATEX PB15TBFCR

## (undated) DEVICE — BAG CLEAR TRASH 1.3M 39X33" P4040C

## (undated) DEVICE — GLOVE ESTEEM POWDER FREE SMT 6.0  2D72PT60

## (undated) DEVICE — LINEN HALF SHEET 5512

## (undated) DEVICE — CATH TRAY FOLEY SURESTEP 16FR DRAIN BAG STATOCK A899916

## (undated) DEVICE — SU VICRYL 2-0 CT-1 36" J345H

## (undated) DEVICE — SPONGE LAP 18X18" X8435

## (undated) DEVICE — ESU LIGASURE IMPACT OPEN SEALER/DVDR CVD LG JAW LF4418

## (undated) DEVICE — GLOVE BIOGEL PI MICRO INDICATOR UNDERGLOVE SZ 6.5 48965

## (undated) DEVICE — SU VICRYL 0 CT-2 27" J334H

## (undated) DEVICE — DRSG TELFA 3X8" 1238

## (undated) RX ORDER — CEFAZOLIN SODIUM/WATER 2 G/20 ML
SYRINGE (ML) INTRAVENOUS
Status: DISPENSED
Start: 2024-05-03

## (undated) RX ORDER — PROPOFOL 10 MG/ML
INJECTION, EMULSION INTRAVENOUS
Status: DISPENSED
Start: 2024-05-03

## (undated) RX ORDER — LIDOCAINE HYDROCHLORIDE 10 MG/ML
INJECTION, SOLUTION EPIDURAL; INFILTRATION; INTRACAUDAL; PERINEURAL
Status: DISPENSED
Start: 2024-05-03

## (undated) RX ORDER — ONDANSETRON 2 MG/ML
INJECTION INTRAMUSCULAR; INTRAVENOUS
Status: DISPENSED
Start: 2024-05-03

## (undated) RX ORDER — PHENAZOPYRIDINE HYDROCHLORIDE 200 MG/1
TABLET, FILM COATED ORAL
Status: DISPENSED
Start: 2024-05-03

## (undated) RX ORDER — DEXAMETHASONE SODIUM PHOSPHATE 4 MG/ML
INJECTION, SOLUTION INTRA-ARTICULAR; INTRALESIONAL; INTRAMUSCULAR; INTRAVENOUS; SOFT TISSUE
Status: DISPENSED
Start: 2024-05-03

## (undated) RX ORDER — VASOPRESSIN 20 U/ML
INJECTION PARENTERAL
Status: DISPENSED
Start: 2024-05-03

## (undated) RX ORDER — TRANEXAMIC ACID 10 MG/ML
INJECTION, SOLUTION INTRAVENOUS
Status: DISPENSED
Start: 2024-05-03

## (undated) RX ORDER — FENTANYL CITRATE 50 UG/ML
INJECTION, SOLUTION INTRAMUSCULAR; INTRAVENOUS
Status: DISPENSED
Start: 2024-05-03

## (undated) RX ORDER — ACETAMINOPHEN 325 MG/1
TABLET ORAL
Status: DISPENSED
Start: 2024-05-03